# Patient Record
Sex: FEMALE | Race: WHITE | Employment: FULL TIME | URBAN - METROPOLITAN AREA
[De-identification: names, ages, dates, MRNs, and addresses within clinical notes are randomized per-mention and may not be internally consistent; named-entity substitution may affect disease eponyms.]

---

## 2017-03-21 ENCOUNTER — APPOINTMENT (OUTPATIENT)
Dept: CT IMAGING | Facility: CLINIC | Age: 37
End: 2017-03-21
Payer: COMMERCIAL

## 2017-03-21 ENCOUNTER — HOSPITAL ENCOUNTER (EMERGENCY)
Facility: CLINIC | Age: 37
Discharge: HOME | End: 2017-03-22
Attending: EMERGENCY MEDICINE
Payer: COMMERCIAL

## 2017-03-21 DIAGNOSIS — R20.0 NUMBNESS AROUND MOUTH: Primary | ICD-10-CM

## 2017-03-21 LAB
ABSOLUTE EOS #: 0.1 K/UL (ref 0–0.4)
ABSOLUTE LYMPH #: 2.8 K/UL (ref 1–4.8)
ABSOLUTE MONO #: 0.5 K/UL (ref 0.1–1.2)
ALBUMIN SERPL-MCNC: 4 G/DL (ref 3.5–5.2)
ALBUMIN/GLOBULIN RATIO: 1.3 (ref 1–2.5)
ALP BLD-CCNC: 80 U/L (ref 35–104)
ALT SERPL-CCNC: 16 U/L (ref 5–33)
ANION GAP SERPL CALCULATED.3IONS-SCNC: 14 MMOL/L (ref 9–17)
AST SERPL-CCNC: 17 U/L
BASOPHILS # BLD: 1 % (ref 0–2)
BASOPHILS ABSOLUTE: 0 K/UL (ref 0–0.2)
BILIRUB SERPL-MCNC: 0.2 MG/DL (ref 0.3–1.2)
BUN BLDV-MCNC: 17 MG/DL (ref 6–20)
BUN/CREAT BLD: ABNORMAL (ref 9–20)
CALCIUM SERPL-MCNC: 9.1 MG/DL (ref 8.6–10.4)
CHLORIDE BLD-SCNC: 103 MMOL/L (ref 98–107)
CO2: 24 MMOL/L (ref 20–31)
CREAT SERPL-MCNC: 0.8 MG/DL (ref 0.5–0.9)
DIFFERENTIAL TYPE: NORMAL
EOSINOPHILS RELATIVE PERCENT: 1 % (ref 1–4)
GFR AFRICAN AMERICAN: >60 ML/MIN
GFR NON-AFRICAN AMERICAN: >60 ML/MIN
GFR SERPL CREATININE-BSD FRML MDRD: ABNORMAL ML/MIN/{1.73_M2}
GFR SERPL CREATININE-BSD FRML MDRD: ABNORMAL ML/MIN/{1.73_M2}
GLUCOSE BLD-MCNC: 105 MG/DL (ref 70–99)
HCG(URINE) PREGNANCY TEST: NEGATIVE
HCT VFR BLD CALC: 39.7 % (ref 36–46)
HEMOGLOBIN: 13.4 G/DL (ref 12–16)
LYMPHOCYTES # BLD: 40 % (ref 24–44)
MAGNESIUM: 2.2 MG/DL (ref 1.6–2.6)
MCH RBC QN AUTO: 30.6 PG (ref 26–34)
MCHC RBC AUTO-ENTMCNC: 33.7 G/DL (ref 31–37)
MCV RBC AUTO: 90.7 FL (ref 80–100)
MONOCYTES # BLD: 7 % (ref 2–11)
PDW BLD-RTO: 13.2 % (ref 12.5–15.4)
PLATELET # BLD: 270 K/UL (ref 140–450)
PLATELET ESTIMATE: NORMAL
PMV BLD AUTO: 8 FL (ref 6–12)
POTASSIUM SERPL-SCNC: 3.7 MMOL/L (ref 3.7–5.3)
RBC # BLD: 4.37 M/UL (ref 4–5.2)
RBC # BLD: NORMAL 10*6/UL
SEG NEUTROPHILS: 51 % (ref 36–66)
SEGMENTED NEUTROPHILS ABSOLUTE COUNT: 3.6 K/UL (ref 1.8–7.7)
SODIUM BLD-SCNC: 141 MMOL/L (ref 135–144)
TOTAL PROTEIN: 7.1 G/DL (ref 6.4–8.3)
WBC # BLD: 7 K/UL (ref 3.5–11)
WBC # BLD: NORMAL 10*3/UL

## 2017-03-21 PROCEDURE — 96361 HYDRATE IV INFUSION ADD-ON: CPT

## 2017-03-21 PROCEDURE — 85025 COMPLETE CBC W/AUTO DIFF WBC: CPT

## 2017-03-21 PROCEDURE — 80053 COMPREHEN METABOLIC PANEL: CPT

## 2017-03-21 PROCEDURE — 83735 ASSAY OF MAGNESIUM: CPT

## 2017-03-21 PROCEDURE — 36415 COLL VENOUS BLD VENIPUNCTURE: CPT

## 2017-03-21 PROCEDURE — 96374 THER/PROPH/DIAG INJ IV PUSH: CPT

## 2017-03-21 PROCEDURE — 84703 CHORIONIC GONADOTROPIN ASSAY: CPT

## 2017-03-21 PROCEDURE — 6360000002 HC RX W HCPCS: Performed by: EMERGENCY MEDICINE

## 2017-03-21 PROCEDURE — 70450 CT HEAD/BRAIN W/O DYE: CPT

## 2017-03-21 PROCEDURE — 4500000002 HC ER NO CHARGE

## 2017-03-21 PROCEDURE — 2580000003 HC RX 258: Performed by: EMERGENCY MEDICINE

## 2017-03-21 RX ORDER — 0.9 % SODIUM CHLORIDE 0.9 %
1000 INTRAVENOUS SOLUTION INTRAVENOUS ONCE
Status: COMPLETED | OUTPATIENT
Start: 2017-03-21 | End: 2017-03-22

## 2017-03-21 RX ORDER — ONDANSETRON 2 MG/ML
4 INJECTION INTRAMUSCULAR; INTRAVENOUS ONCE
Status: COMPLETED | OUTPATIENT
Start: 2017-03-21 | End: 2017-03-21

## 2017-03-21 RX ADMIN — SODIUM CHLORIDE 1000 ML: 9 INJECTION, SOLUTION INTRAVENOUS at 23:15

## 2017-03-21 RX ADMIN — ONDANSETRON 4 MG: 2 INJECTION INTRAMUSCULAR; INTRAVENOUS at 23:15

## 2017-03-22 ENCOUNTER — APPOINTMENT (OUTPATIENT)
Dept: CT IMAGING | Facility: CLINIC | Age: 37
End: 2017-03-22
Payer: COMMERCIAL

## 2017-03-22 VITALS
TEMPERATURE: 97.8 F | HEIGHT: 64 IN | SYSTOLIC BLOOD PRESSURE: 128 MMHG | OXYGEN SATURATION: 99 % | RESPIRATION RATE: 14 BRPM | HEART RATE: 58 BPM | WEIGHT: 158 LBS | BODY MASS INDEX: 26.98 KG/M2 | DIASTOLIC BLOOD PRESSURE: 79 MMHG

## 2017-03-22 PROCEDURE — 6370000000 HC RX 637 (ALT 250 FOR IP): Performed by: EMERGENCY MEDICINE

## 2017-03-22 PROCEDURE — 70496 CT ANGIOGRAPHY HEAD: CPT

## 2017-03-22 PROCEDURE — 6360000004 HC RX CONTRAST MEDICATION: Performed by: EMERGENCY MEDICINE

## 2017-03-22 PROCEDURE — 2580000003 HC RX 258: Performed by: EMERGENCY MEDICINE

## 2017-03-22 RX ORDER — SODIUM CHLORIDE 0.9 % (FLUSH) 0.9 %
10 SYRINGE (ML) INJECTION PRN
Status: DISCONTINUED | OUTPATIENT
Start: 2017-03-22 | End: 2017-03-22 | Stop reason: HOSPADM

## 2017-03-22 RX ORDER — 0.9 % SODIUM CHLORIDE 0.9 %
75 INTRAVENOUS SOLUTION INTRAVENOUS ONCE
Status: COMPLETED | OUTPATIENT
Start: 2017-03-22 | End: 2017-03-22

## 2017-03-22 RX ORDER — DIPHENHYDRAMINE HCL 25 MG
25 TABLET ORAL ONCE
Status: COMPLETED | OUTPATIENT
Start: 2017-03-22 | End: 2017-03-22

## 2017-03-22 RX ADMIN — IOVERSOL 90 ML: 741 INJECTION INTRA-ARTERIAL; INTRAVENOUS at 00:45

## 2017-03-22 RX ADMIN — SODIUM CHLORIDE 1000 ML: 9 INJECTION, SOLUTION INTRAVENOUS at 00:53

## 2017-03-22 RX ADMIN — Medication 10 ML: at 00:47

## 2017-03-22 RX ADMIN — DIPHENHYDRAMINE HCL 25 MG: 25 TABLET ORAL at 02:04

## 2017-03-22 ASSESSMENT — ENCOUNTER SYMPTOMS
EYE PAIN: 0
RHINORRHEA: 0
COUGH: 0
NAUSEA: 0
SORE THROAT: 0
ABDOMINAL PAIN: 0
VOMITING: 0
SHORTNESS OF BREATH: 0
BACK PAIN: 0
DIARRHEA: 0

## 2017-03-23 ENCOUNTER — HOSPITAL ENCOUNTER (OUTPATIENT)
Age: 37
Setting detail: OBSERVATION
Discharge: HOME | End: 2017-03-25
Attending: EMERGENCY MEDICINE | Admitting: EMERGENCY MEDICINE
Payer: COMMERCIAL

## 2017-03-23 ENCOUNTER — APPOINTMENT (OUTPATIENT)
Dept: MRI IMAGING | Age: 37
End: 2017-03-23
Payer: COMMERCIAL

## 2017-03-23 DIAGNOSIS — R20.0 NUMBNESS: Primary | ICD-10-CM

## 2017-03-23 LAB
ABSOLUTE EOS #: 0 K/UL (ref 0–0.4)
ABSOLUTE LYMPH #: 1 K/UL (ref 1–4.8)
ABSOLUTE MONO #: 0.2 K/UL (ref 0.1–1.2)
ALBUMIN SERPL-MCNC: 4.3 G/DL (ref 3.5–5.2)
ALBUMIN/GLOBULIN RATIO: 1.3 (ref 1–2.5)
ALP BLD-CCNC: 82 U/L (ref 35–104)
ALT SERPL-CCNC: 16 U/L (ref 5–33)
ANION GAP SERPL CALCULATED.3IONS-SCNC: 12 MMOL/L (ref 9–17)
AST SERPL-CCNC: 16 U/L
BASOPHILS # BLD: 1 % (ref 0–2)
BASOPHILS ABSOLUTE: 0 K/UL (ref 0–0.2)
BILIRUB SERPL-MCNC: 0.52 MG/DL (ref 0.3–1.2)
BUN BLDV-MCNC: 13 MG/DL (ref 6–20)
BUN/CREAT BLD: NORMAL (ref 9–20)
CALCIUM SERPL-MCNC: 9.4 MG/DL (ref 8.6–10.4)
CHLORIDE BLD-SCNC: 105 MMOL/L (ref 98–107)
CO2: 25 MMOL/L (ref 20–31)
CREAT SERPL-MCNC: 0.77 MG/DL (ref 0.5–0.9)
DIFFERENTIAL TYPE: ABNORMAL
EOSINOPHILS RELATIVE PERCENT: 0 % (ref 1–4)
GFR AFRICAN AMERICAN: >60 ML/MIN
GFR NON-AFRICAN AMERICAN: >60 ML/MIN
GFR SERPL CREATININE-BSD FRML MDRD: NORMAL ML/MIN/{1.73_M2}
GFR SERPL CREATININE-BSD FRML MDRD: NORMAL ML/MIN/{1.73_M2}
GLUCOSE BLD-MCNC: 90 MG/DL (ref 70–99)
HCG QUALITATIVE: NEGATIVE
HCT VFR BLD CALC: 39.7 % (ref 36–46)
HEMOGLOBIN: 13.8 G/DL (ref 12–16)
LYMPHOCYTES # BLD: 18 % (ref 24–44)
MAGNESIUM: 2.5 MG/DL (ref 1.6–2.6)
MCH RBC QN AUTO: 30.8 PG (ref 26–34)
MCHC RBC AUTO-ENTMCNC: 34.8 G/DL (ref 31–37)
MCV RBC AUTO: 88.6 FL (ref 80–100)
MONOCYTES # BLD: 4 % (ref 2–11)
PDW BLD-RTO: 13.9 % (ref 12.5–15.4)
PLATELET # BLD: 287 K/UL (ref 140–450)
PLATELET ESTIMATE: ABNORMAL
PMV BLD AUTO: 8.4 FL (ref 6–12)
POTASSIUM SERPL-SCNC: 4.1 MMOL/L (ref 3.7–5.3)
RBC # BLD: 4.48 M/UL (ref 4–5.2)
RBC # BLD: ABNORMAL 10*6/UL
SEG NEUTROPHILS: 77 % (ref 36–66)
SEGMENTED NEUTROPHILS ABSOLUTE COUNT: 4.5 K/UL (ref 1.8–7.7)
SODIUM BLD-SCNC: 142 MMOL/L (ref 135–144)
TOTAL PROTEIN: 7.6 G/DL (ref 6.4–8.3)
WBC # BLD: 5.8 K/UL (ref 3.5–11)
WBC # BLD: ABNORMAL 10*3/UL

## 2017-03-23 PROCEDURE — 80053 COMPREHEN METABOLIC PANEL: CPT

## 2017-03-23 PROCEDURE — 6370000000 HC RX 637 (ALT 250 FOR IP): Performed by: EMERGENCY MEDICINE

## 2017-03-23 PROCEDURE — G0378 HOSPITAL OBSERVATION PER HR: HCPCS

## 2017-03-23 PROCEDURE — 70551 MRI BRAIN STEM W/O DYE: CPT

## 2017-03-23 PROCEDURE — 99285 EMERGENCY DEPT VISIT HI MDM: CPT

## 2017-03-23 PROCEDURE — 2580000003 HC RX 258: Performed by: EMERGENCY MEDICINE

## 2017-03-23 PROCEDURE — 6360000002 HC RX W HCPCS: Performed by: EMERGENCY MEDICINE

## 2017-03-23 PROCEDURE — 96372 THER/PROPH/DIAG INJ SC/IM: CPT

## 2017-03-23 PROCEDURE — 84703 CHORIONIC GONADOTROPIN ASSAY: CPT

## 2017-03-23 PROCEDURE — 83735 ASSAY OF MAGNESIUM: CPT

## 2017-03-23 PROCEDURE — 85025 COMPLETE CBC W/AUTO DIFF WBC: CPT

## 2017-03-23 RX ORDER — ACETAMINOPHEN 325 MG/1
650 TABLET ORAL EVERY 4 HOURS PRN
Status: DISCONTINUED | OUTPATIENT
Start: 2017-03-23 | End: 2017-03-25 | Stop reason: HOSPADM

## 2017-03-23 RX ORDER — DEXAMETHASONE 4 MG/1
4 TABLET ORAL
Status: DISCONTINUED | OUTPATIENT
Start: 2017-03-24 | End: 2017-03-24

## 2017-03-23 RX ORDER — SODIUM CHLORIDE 0.9 % (FLUSH) 0.9 %
10 SYRINGE (ML) INJECTION EVERY 12 HOURS SCHEDULED
Status: DISCONTINUED | OUTPATIENT
Start: 2017-03-23 | End: 2017-03-25 | Stop reason: HOSPADM

## 2017-03-23 RX ORDER — SODIUM CHLORIDE 0.9 % (FLUSH) 0.9 %
10 SYRINGE (ML) INJECTION PRN
Status: DISCONTINUED | OUTPATIENT
Start: 2017-03-23 | End: 2017-03-25 | Stop reason: HOSPADM

## 2017-03-23 RX ORDER — DEXAMETHASONE 4 MG/1
4 TABLET ORAL
Status: ON HOLD | COMMUNITY
End: 2017-03-25 | Stop reason: HOSPADM

## 2017-03-23 RX ADMIN — VITAMIN D, TAB 1000IU (100/BT) 1000 UNITS: 25 TAB at 17:01

## 2017-03-23 RX ADMIN — ENOXAPARIN SODIUM 40 MG: 40 INJECTION SUBCUTANEOUS at 17:01

## 2017-03-23 RX ADMIN — Medication 10 ML: at 20:00

## 2017-03-23 ASSESSMENT — ENCOUNTER SYMPTOMS
WHEEZING: 0
COUGH: 0
DIARRHEA: 0
RHINORRHEA: 0
PHOTOPHOBIA: 0
ABDOMINAL PAIN: 0
NAUSEA: 0
VOMITING: 0
SHORTNESS OF BREATH: 0
SORE THROAT: 0

## 2017-03-24 LAB
FOLATE: 16.2 NG/ML
TSH SERPL DL<=0.05 MIU/L-ACNC: 0.41 MIU/L (ref 0.3–5)
VITAMIN B-12: 337 PG/ML (ref 211–946)

## 2017-03-24 PROCEDURE — 2580000003 HC RX 258: Performed by: EMERGENCY MEDICINE

## 2017-03-24 PROCEDURE — 2580000003 HC RX 258: Performed by: PSYCHIATRY & NEUROLOGY

## 2017-03-24 PROCEDURE — 82746 ASSAY OF FOLIC ACID SERUM: CPT

## 2017-03-24 PROCEDURE — 6360000002 HC RX W HCPCS: Performed by: EMERGENCY MEDICINE

## 2017-03-24 PROCEDURE — G0378 HOSPITAL OBSERVATION PER HR: HCPCS

## 2017-03-24 PROCEDURE — 96365 THER/PROPH/DIAG IV INF INIT: CPT

## 2017-03-24 PROCEDURE — 6370000000 HC RX 637 (ALT 250 FOR IP): Performed by: PSYCHIATRY & NEUROLOGY

## 2017-03-24 PROCEDURE — 36415 COLL VENOUS BLD VENIPUNCTURE: CPT

## 2017-03-24 PROCEDURE — 84443 ASSAY THYROID STIM HORMONE: CPT

## 2017-03-24 PROCEDURE — 6360000002 HC RX W HCPCS: Performed by: PSYCHIATRY & NEUROLOGY

## 2017-03-24 PROCEDURE — 82607 VITAMIN B-12: CPT

## 2017-03-24 PROCEDURE — 95819 EEG AWAKE AND ASLEEP: CPT

## 2017-03-24 PROCEDURE — 6370000000 HC RX 637 (ALT 250 FOR IP): Performed by: EMERGENCY MEDICINE

## 2017-03-24 PROCEDURE — 96372 THER/PROPH/DIAG INJ SC/IM: CPT

## 2017-03-24 PROCEDURE — 99219 PR INITIAL OBSERVATION CARE/DAY 50 MINUTES: CPT | Performed by: PSYCHIATRY & NEUROLOGY

## 2017-03-24 RX ORDER — DEXAMETHASONE SODIUM PHOSPHATE 4 MG/ML
4 INJECTION, SOLUTION INTRA-ARTICULAR; INTRALESIONAL; INTRAMUSCULAR; INTRAVENOUS; SOFT TISSUE
Status: DISCONTINUED | OUTPATIENT
Start: 2017-03-24 | End: 2017-03-24

## 2017-03-24 RX ORDER — DEXAMETHASONE 4 MG/1
4 TABLET ORAL
Status: DISCONTINUED | OUTPATIENT
Start: 2017-03-24 | End: 2017-03-25

## 2017-03-24 RX ORDER — GABAPENTIN 100 MG/1
100 CAPSULE ORAL 3 TIMES DAILY
Status: DISCONTINUED | OUTPATIENT
Start: 2017-03-24 | End: 2017-03-25

## 2017-03-24 RX ORDER — LEVETIRACETAM 500 MG/1
500 TABLET ORAL 2 TIMES DAILY
Status: DISCONTINUED | OUTPATIENT
Start: 2017-03-24 | End: 2017-03-25

## 2017-03-24 RX ORDER — DEXAMETHASONE 4 MG/1
4 TABLET ORAL DAILY
Status: DISCONTINUED | OUTPATIENT
Start: 2017-03-24 | End: 2017-03-24

## 2017-03-24 RX ADMIN — DEXAMETHASONE 4 MG: 4 TABLET ORAL at 10:49

## 2017-03-24 RX ADMIN — Medication 10 ML: at 20:38

## 2017-03-24 RX ADMIN — VITAMIN D, TAB 1000IU (100/BT) 1000 UNITS: 25 TAB at 10:52

## 2017-03-24 RX ADMIN — LEVETIRACETAM 1000 MG: 100 INJECTION, SOLUTION INTRAVENOUS at 15:54

## 2017-03-24 RX ADMIN — Medication 10 ML: at 10:55

## 2017-03-24 RX ADMIN — LEVETIRACETAM 500 MG: 500 TABLET, FILM COATED ORAL at 20:36

## 2017-03-24 RX ADMIN — ENOXAPARIN SODIUM 40 MG: 40 INJECTION SUBCUTANEOUS at 10:52

## 2017-03-24 ASSESSMENT — PAIN DESCRIPTION - LOCATION: LOCATION: SHOULDER;LEG;FOOT

## 2017-03-24 ASSESSMENT — PAIN DESCRIPTION - DESCRIPTORS: DESCRIPTORS: NUMBNESS

## 2017-03-24 ASSESSMENT — PAIN SCALES - GENERAL: PAINLEVEL_OUTOF10: 6

## 2017-03-24 ASSESSMENT — PAIN DESCRIPTION - PAIN TYPE: TYPE: ACUTE PAIN

## 2017-03-25 VITALS
TEMPERATURE: 97.9 F | DIASTOLIC BLOOD PRESSURE: 66 MMHG | HEIGHT: 64 IN | WEIGHT: 156.8 LBS | RESPIRATION RATE: 18 BRPM | SYSTOLIC BLOOD PRESSURE: 106 MMHG | OXYGEN SATURATION: 98 % | BODY MASS INDEX: 26.77 KG/M2 | HEART RATE: 57 BPM

## 2017-03-25 PROBLEM — Z98.890 HISTORY OF SURGERY FOR CEREBRAL ANEURYSM: Status: ACTIVE | Noted: 2017-03-25

## 2017-03-25 PROBLEM — R20.2 PARESTHESIAS: Status: ACTIVE | Noted: 2017-03-25

## 2017-03-25 PROCEDURE — 97162 PT EVAL MOD COMPLEX 30 MIN: CPT

## 2017-03-25 PROCEDURE — 99225 PR SBSQ OBSERVATION CARE/DAY 25 MINUTES: CPT | Performed by: PSYCHIATRY & NEUROLOGY

## 2017-03-25 PROCEDURE — 6370000000 HC RX 637 (ALT 250 FOR IP): Performed by: EMERGENCY MEDICINE

## 2017-03-25 PROCEDURE — G8979 MOBILITY GOAL STATUS: HCPCS

## 2017-03-25 PROCEDURE — 6370000000 HC RX 637 (ALT 250 FOR IP): Performed by: PSYCHIATRY & NEUROLOGY

## 2017-03-25 PROCEDURE — G8978 MOBILITY CURRENT STATUS: HCPCS

## 2017-03-25 PROCEDURE — G8980 MOBILITY D/C STATUS: HCPCS

## 2017-03-25 PROCEDURE — 97530 THERAPEUTIC ACTIVITIES: CPT

## 2017-03-25 PROCEDURE — G0378 HOSPITAL OBSERVATION PER HR: HCPCS

## 2017-03-25 PROCEDURE — 2580000003 HC RX 258: Performed by: EMERGENCY MEDICINE

## 2017-03-25 RX ORDER — LEVETIRACETAM 500 MG/1
500 TABLET ORAL 2 TIMES DAILY
Qty: 60 TABLET | Refills: 3 | Status: SHIPPED | OUTPATIENT
Start: 2017-03-25 | End: 2017-03-25 | Stop reason: HOSPADM

## 2017-03-25 RX ADMIN — DEXAMETHASONE 4 MG: 4 TABLET ORAL at 08:26

## 2017-03-25 RX ADMIN — LEVETIRACETAM 500 MG: 500 TABLET, FILM COATED ORAL at 09:15

## 2017-03-25 RX ADMIN — Medication 10 ML: at 09:17

## 2017-03-25 RX ADMIN — VITAMIN D, TAB 1000IU (100/BT) 1000 UNITS: 25 TAB at 09:15

## 2017-10-04 ENCOUNTER — HOSPITAL ENCOUNTER (OUTPATIENT)
Age: 37
Setting detail: OBSERVATION
Discharge: HOME | End: 2017-10-06
Attending: EMERGENCY MEDICINE | Admitting: SURGERY
Payer: COMMERCIAL

## 2017-10-04 ENCOUNTER — APPOINTMENT (OUTPATIENT)
Dept: CT IMAGING | Facility: CLINIC | Age: 37
End: 2017-10-04
Payer: COMMERCIAL

## 2017-10-04 ENCOUNTER — APPOINTMENT (OUTPATIENT)
Dept: GENERAL RADIOLOGY | Facility: CLINIC | Age: 37
End: 2017-10-04
Payer: COMMERCIAL

## 2017-10-04 ENCOUNTER — HOSPITAL ENCOUNTER (EMERGENCY)
Facility: CLINIC | Age: 37
Discharge: ANOTHER HOSPITAL | End: 2017-10-04
Attending: EMERGENCY MEDICINE
Payer: COMMERCIAL

## 2017-10-04 VITALS
WEIGHT: 168 LBS | OXYGEN SATURATION: 99 % | BODY MASS INDEX: 28.68 KG/M2 | HEART RATE: 68 BPM | TEMPERATURE: 98.2 F | RESPIRATION RATE: 18 BRPM | HEIGHT: 64 IN | DIASTOLIC BLOOD PRESSURE: 73 MMHG | SYSTOLIC BLOOD PRESSURE: 108 MMHG
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
## 2017-10-04 DIAGNOSIS — S30.1XXA TRAUMATIC HEMATOMA OF FLANK, INITIAL ENCOUNTER: ICD-10-CM

## 2017-10-04 DIAGNOSIS — S42.114A CLOSED NONDISPLACED FRACTURE OF BODY OF RIGHT SCAPULA, INITIAL ENCOUNTER: Primary | ICD-10-CM

## 2017-10-04 DIAGNOSIS — S30.1XXA RIGHT FLANK HEMATOMA, INITIAL ENCOUNTER: ICD-10-CM

## 2017-10-04 LAB
-: ABNORMAL
ABSOLUTE EOS #: 0.2 K/UL (ref 0–0.4)
ABSOLUTE LYMPH #: 3.7 K/UL (ref 1–4.8)
ABSOLUTE MONO #: 0.5 K/UL (ref 0.1–1.2)
ALBUMIN SERPL-MCNC: 3.7 G/DL (ref 3.5–5.2)
ALBUMIN/GLOBULIN RATIO: 1.3 (ref 1–2.5)
ALP BLD-CCNC: 59 U/L (ref 35–104)
ALT SERPL-CCNC: 30 U/L (ref 5–33)
AMORPHOUS: ABNORMAL
ANION GAP SERPL CALCULATED.3IONS-SCNC: 11 MMOL/L (ref 9–17)
AST SERPL-CCNC: 39 U/L
BACTERIA: ABNORMAL
BASOPHILS # BLD: 0 %
BASOPHILS ABSOLUTE: 0 K/UL (ref 0–0.2)
BILIRUB SERPL-MCNC: <0.1 MG/DL (ref 0.3–1.2)
BILIRUBIN DIRECT: <0.08 MG/DL
BILIRUBIN URINE: NEGATIVE
BILIRUBIN, INDIRECT: ABNORMAL MG/DL (ref 0–1)
BUN BLDV-MCNC: 16 MG/DL (ref 6–20)
BUN/CREAT BLD: ABNORMAL (ref 9–20)
CALCIUM SERPL-MCNC: 8.7 MG/DL (ref 8.6–10.4)
CASTS UA: ABNORMAL /LPF (ref 0–2)
CHLORIDE BLD-SCNC: 103 MMOL/L (ref 98–107)
CO2: 25 MMOL/L (ref 20–31)
COLOR: YELLOW
COMMENT UA: ABNORMAL
CREAT SERPL-MCNC: 0.7 MG/DL (ref 0.5–0.9)
CRYSTALS, UA: ABNORMAL /HPF
DIFFERENTIAL TYPE: NORMAL
EOSINOPHILS RELATIVE PERCENT: 2 %
EPITHELIAL CELLS UA: ABNORMAL /HPF (ref 0–5)
GFR AFRICAN AMERICAN: >60 ML/MIN
GFR NON-AFRICAN AMERICAN: >60 ML/MIN
GFR SERPL CREATININE-BSD FRML MDRD: ABNORMAL ML/MIN/{1.73_M2}
GFR SERPL CREATININE-BSD FRML MDRD: ABNORMAL ML/MIN/{1.73_M2}
GLOBULIN: ABNORMAL G/DL (ref 1.5–3.8)
GLUCOSE BLD-MCNC: 111 MG/DL (ref 70–99)
GLUCOSE URINE: NEGATIVE
HCT VFR BLD CALC: 38.6 % (ref 36–46)
HEMOGLOBIN: 13.1 G/DL (ref 12–16)
KETONES, URINE: NEGATIVE
LEUKOCYTE ESTERASE, URINE: NEGATIVE
LIPASE: 55 U/L (ref 13–60)
LYMPHOCYTES # BLD: 45 %
MCH RBC QN AUTO: 32.1 PG (ref 26–34)
MCHC RBC AUTO-ENTMCNC: 33.9 G/DL (ref 31–37)
MCV RBC AUTO: 94.9 FL (ref 80–100)
MONOCYTES # BLD: 6 %
MUCUS: ABNORMAL
NITRITE, URINE: NEGATIVE
OTHER OBSERVATIONS UA: ABNORMAL
PDW BLD-RTO: 13.4 % (ref 12.5–15.4)
PH UA: 6.5 (ref 5–8)
PLATELET # BLD: 288 K/UL (ref 140–450)
PLATELET ESTIMATE: NORMAL
PMV BLD AUTO: 8.6 FL (ref 6–12)
POTASSIUM SERPL-SCNC: 4.8 MMOL/L (ref 3.7–5.3)
PROTEIN UA: NEGATIVE
RBC # BLD: 4.06 M/UL (ref 4–5.2)
RBC # BLD: NORMAL 10*6/UL
RBC UA: ABNORMAL /HPF (ref 0–2)
RENAL EPITHELIAL, UA: ABNORMAL /HPF
SEG NEUTROPHILS: 47 %
SEGMENTED NEUTROPHILS ABSOLUTE COUNT: 3.9 K/UL (ref 1.8–7.7)
SODIUM BLD-SCNC: 139 MMOL/L (ref 135–144)
SPECIFIC GRAVITY UA: 1.01 (ref 1–1.03)
TOTAL PROTEIN: 6.6 G/DL (ref 6.4–8.3)
TRICHOMONAS: ABNORMAL
TURBIDITY: CLEAR
URINE HGB: ABNORMAL
UROBILINOGEN, URINE: NORMAL
WBC # BLD: 8.3 K/UL (ref 3.5–11)
WBC # BLD: NORMAL 10*3/UL
WBC UA: ABNORMAL /HPF (ref 0–5)
YEAST: ABNORMAL

## 2017-10-04 PROCEDURE — 70450 CT HEAD/BRAIN W/O DYE: CPT

## 2017-10-04 PROCEDURE — 6360000002 HC RX W HCPCS: Performed by: SPECIALIST

## 2017-10-04 PROCEDURE — 6360000002 HC RX W HCPCS: Performed by: EMERGENCY MEDICINE

## 2017-10-04 PROCEDURE — 96374 THER/PROPH/DIAG INJ IV PUSH: CPT

## 2017-10-04 PROCEDURE — 2500000003 HC RX 250 WO HCPCS: Performed by: EMERGENCY MEDICINE

## 2017-10-04 PROCEDURE — 83690 ASSAY OF LIPASE: CPT

## 2017-10-04 PROCEDURE — 74177 CT ABD & PELVIS W/CONTRAST: CPT

## 2017-10-04 PROCEDURE — 36415 COLL VENOUS BLD VENIPUNCTURE: CPT

## 2017-10-04 PROCEDURE — 6360000004 HC RX CONTRAST MEDICATION: Performed by: SPECIALIST

## 2017-10-04 PROCEDURE — 2580000003 HC RX 258: Performed by: SPECIALIST

## 2017-10-04 PROCEDURE — 85025 COMPLETE CBC W/AUTO DIFF WBC: CPT

## 2017-10-04 PROCEDURE — 80076 HEPATIC FUNCTION PANEL: CPT

## 2017-10-04 PROCEDURE — 73502 X-RAY EXAM HIP UNI 2-3 VIEWS: CPT

## 2017-10-04 PROCEDURE — 81001 URINALYSIS AUTO W/SCOPE: CPT

## 2017-10-04 PROCEDURE — 2580000003 HC RX 258: Performed by: EMERGENCY MEDICINE

## 2017-10-04 PROCEDURE — 72125 CT NECK SPINE W/O DYE: CPT

## 2017-10-04 PROCEDURE — 99285 EMERGENCY DEPT VISIT HI MDM: CPT

## 2017-10-04 PROCEDURE — 6360000004 HC RX CONTRAST MEDICATION: Performed by: EMERGENCY MEDICINE

## 2017-10-04 PROCEDURE — 71260 CT THORAX DX C+: CPT

## 2017-10-04 PROCEDURE — 80048 BASIC METABOLIC PNL TOTAL CA: CPT

## 2017-10-04 PROCEDURE — 73030 X-RAY EXAM OF SHOULDER: CPT

## 2017-10-04 PROCEDURE — 96375 TX/PRO/DX INJ NEW DRUG ADDON: CPT

## 2017-10-04 RX ORDER — BACLOFEN 10 MG/1
10 TABLET ORAL 3 TIMES DAILY
COMMUNITY
End: 2017-12-19 | Stop reason: ALTCHOICE

## 2017-10-04 RX ORDER — 0.9 % SODIUM CHLORIDE 0.9 %
70 INTRAVENOUS SOLUTION INTRAVENOUS ONCE
Status: COMPLETED | OUTPATIENT
Start: 2017-10-04 | End: 2017-10-04

## 2017-10-04 RX ORDER — MORPHINE SULFATE 4 MG/ML
4 INJECTION, SOLUTION INTRAMUSCULAR; INTRAVENOUS ONCE
Status: COMPLETED | OUTPATIENT
Start: 2017-10-04 | End: 2017-10-04

## 2017-10-04 RX ORDER — 0.9 % SODIUM CHLORIDE 0.9 %
500 INTRAVENOUS SOLUTION INTRAVENOUS ONCE
Status: COMPLETED | OUTPATIENT
Start: 2017-10-04 | End: 2017-10-04

## 2017-10-04 RX ORDER — 0.9 % SODIUM CHLORIDE 0.9 %
1000 INTRAVENOUS SOLUTION INTRAVENOUS ONCE
Status: COMPLETED | OUTPATIENT
Start: 2017-10-04 | End: 2017-10-04

## 2017-10-04 RX ORDER — LIDOCAINE HYDROCHLORIDE 10 MG/ML
20 INJECTION, SOLUTION INFILTRATION; PERINEURAL ONCE
Status: COMPLETED | OUTPATIENT
Start: 2017-10-04 | End: 2017-10-04

## 2017-10-04 RX ORDER — SODIUM CHLORIDE 0.9 % (FLUSH) 0.9 %
10 SYRINGE (ML) INJECTION PRN
Status: DISCONTINUED | OUTPATIENT
Start: 2017-10-04 | End: 2017-10-04 | Stop reason: HOSPADM

## 2017-10-04 RX ORDER — FENTANYL CITRATE 50 UG/ML
50 INJECTION, SOLUTION INTRAMUSCULAR; INTRAVENOUS ONCE
Status: COMPLETED | OUTPATIENT
Start: 2017-10-04 | End: 2017-10-04

## 2017-10-04 RX ORDER — IBUPROFEN 800 MG/1
800 TABLET ORAL EVERY 8 HOURS PRN
COMMUNITY
End: 2020-03-18

## 2017-10-04 RX ORDER — SODIUM CHLORIDE 9 MG/ML
INJECTION, SOLUTION INTRAVENOUS CONTINUOUS
Status: DISCONTINUED | OUTPATIENT
Start: 2017-10-04 | End: 2017-10-04 | Stop reason: HOSPADM

## 2017-10-04 RX ADMIN — MORPHINE SULFATE 4 MG: 4 INJECTION, SOLUTION INTRAMUSCULAR; INTRAVENOUS at 17:45

## 2017-10-04 RX ADMIN — LIDOCAINE HYDROCHLORIDE 20 ML: 10 INJECTION, SOLUTION INFILTRATION; PERINEURAL at 18:50

## 2017-10-04 RX ADMIN — FENTANYL CITRATE 50 MCG: 50 INJECTION INTRAMUSCULAR; INTRAVENOUS at 20:20

## 2017-10-04 RX ADMIN — SODIUM CHLORIDE 70 ML: 9 INJECTION, SOLUTION INTRAVENOUS at 18:28

## 2017-10-04 RX ADMIN — SODIUM CHLORIDE 1000 ML: 9 INJECTION, SOLUTION INTRAVENOUS at 17:45

## 2017-10-04 RX ADMIN — HYDROMORPHONE HYDROCHLORIDE 1 MG: 1 INJECTION, SOLUTION INTRAMUSCULAR; INTRAVENOUS; SUBCUTANEOUS at 23:11

## 2017-10-04 RX ADMIN — IOVERSOL 80 ML: 741 INJECTION INTRA-ARTERIAL; INTRAVENOUS at 18:27

## 2017-10-04 RX ADMIN — SODIUM CHLORIDE 70 ML: 9 INJECTION, SOLUTION INTRAVENOUS at 20:48

## 2017-10-04 RX ADMIN — IOVERSOL 75 ML: 741 INJECTION INTRA-ARTERIAL; INTRAVENOUS at 20:47

## 2017-10-04 RX ADMIN — SODIUM CHLORIDE: 9 INJECTION, SOLUTION INTRAVENOUS at 22:14

## 2017-10-04 RX ADMIN — Medication 10 ML: at 20:48

## 2017-10-04 RX ADMIN — SODIUM CHLORIDE 500 ML: 9 INJECTION, SOLUTION INTRAVENOUS at 20:21

## 2017-10-04 RX ADMIN — Medication 10 ML: at 18:28

## 2017-10-04 ASSESSMENT — PAIN SCALES - GENERAL
PAINLEVEL_OUTOF10: 5
PAINLEVEL_OUTOF10: 9
PAINLEVEL_OUTOF10: 10
PAINLEVEL_OUTOF10: 9
PAINLEVEL_OUTOF10: 5
PAINLEVEL_OUTOF10: 9
PAINLEVEL_OUTOF10: 9

## 2017-10-04 ASSESSMENT — PAIN DESCRIPTION - PAIN TYPE
TYPE: ACUTE PAIN
TYPE: ACUTE PAIN

## 2017-10-04 ASSESSMENT — PAIN DESCRIPTION - LOCATION: LOCATION: HAND;BACK

## 2017-10-04 ASSESSMENT — PAIN DESCRIPTION - ORIENTATION: ORIENTATION: RIGHT

## 2017-10-04 ASSESSMENT — PAIN DESCRIPTION - DESCRIPTORS: DESCRIPTORS: DISCOMFORT
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
## 2017-10-04 NOTE — LETTER
[facility] Ortho/Med Surg  [address] [facility] [address]  Phone: [phone]            October 5, 2017     Patient: Asha Morataya   YOB: 1980   Date of Visit: 10/4/2017       To Whom It May Concern: It is my medical opinion that Rishabh Robb should remain out of work until cleared to return by Orthopedic Surgeon after her follow up appointment in 2 weeks. If you have any questions or concerns, please don't hesitate to call. Sincerely,        Dr.R.L. Tobar/Isaac Hernandez RN, Trauma Coordinator
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
## 2017-10-04 NOTE — IP AVS SNAPSHOT
After Visit Summary  (Discharge Instructions)    Medication List for Home    Based on the information you provided to us as well as any changes during this visit, the following is your updated medication list.  Compare this with your prescription bottles at home. If you have any questions or concerns, contact your primary care physician's office. Daily Medication List (This medication list can be shared with any healthcare provider who is helping you manage your medications)      There are NEW medications for you. START taking them after you leave the hospital        Last Dose    Next Dose Due AM NOON PM NIGHT    HYDROcodone-acetaminophen 5-325 MG per tablet   Commonly known as:  NORCO   Take 1 tablet by mouth every 4 hours as needed for Pain . 2 tablets on 10/6/2017  8:41 AM     As needed, as prescribed                           These are medications you told us you were taking at home, CONTINUE taking them after you leave the hospital        Last Dose    Next Dose Due AM NOON PM NIGHT    baclofen 10 MG tablet   Commonly known as:  LIORESAL   Take 10 mg by mouth 3 times daily                                            ibuprofen 800 MG tablet   Commonly known as:  ADVIL;MOTRIN   Take 800 mg by mouth every 6 hours as needed for Pain                                         vitamin D 1000 UNIT Tabs tablet   Commonly known as:  CHOLECALCIFEROL   Take 1,000 Units by mouth daily                                                 Where to Get Your Medications      You can get these medications from any pharmacy     Bring a paper prescription for each of these medications     HYDROcodone-acetaminophen 5-325 MG per tablet               Allergies as of 10/6/2017        Reactions    Penicillins       Immunizations as of 10/6/2017     No immunizations on file.       Last Vitals          Most Recent Value    Temp  98.4 °F (36.9 °C)    Pulse  70    Resp  18    BP  (!)  111/59 Discharge Instructions for Trauma         Continue to use your Incentive Spirometer as directed. Follow up with Orthopedics as directed. What to do after you leave the hospital:    Bathing: if you have sutures or staples in place, you may shower. No tub baths, soaking or submerging yourself in water. No hot tubs or swimming. Recommended activity: activity as tolerated and no driving for today and no driving while on analgesics    Weight Bearing Status:  NWB  right upper    Recommended diet: regular diet  Depending on your injuries, your doctor may want you to follow a specific diet. Some pain medicine can cause constipation . To avoid this problem:   · Drink plenty of fluids. · Eat foods high in fiber , such as:   ¨ Whole grain cereals and breads   ¨ Fruits and vegetables   ¨ Legumes (eg, beans, lentils)   · If you are still having problems, talk to your doctor about using laxatives or stool softeners. General questions or concerns may be called to the trauma nurse line at [phone] and please leave a message. Trauma is a life-threatening condition. Your doctor will want to closely monitor you. Be sure to go to all of your appointments. *Call your doctor if you experience any of the following symptoms: temperature greater than 99 degrees F, pain uncontrolled by pain medication, shortness of breath, nausea and vomiting, dizziness or lightheadedness, rapid, irregular heartbeat; chest pain. In case of an emergency,  CALL 911 or report to your nearest emergency department. Important information for a smoker       SMOKING: QUIT SMOKING. THIS IS THE MOST IMPORTANT ACTION YOU CAN TAKE TO IMPROVE YOUR CURRENT AND FUTURE HEALTH. Call the [address] [facility] NOW ([phone])    Smoking harms nonsmokers.  When nonsmokers are around people who smoke, they absorb nicotine, carbon monoxide, and other ingredients of tobacco smoke. DO NOT SMOKE AROUND CHILDREN     Children exposed to secondhand smoke are at an increased risk of:  Sudden Infant Death Syndrome (SIDS), acute respiratory infections, inflammation of the middle ear, and severe asthma. Over a longer time, it causes heart disease and lung cancer. There is no safe level of exposure to secondhand smoke. MyChart Signup     Our records indicate that you have an active [employer] account. You can view your After Visit Summary by going to [email or web address] and logging in with your [employer] username and password. If you don't have a [employer] username and password but a parent or guardian has access to your record, the parent or guardian should login with their own [employer] username and password and access your record to view the After Visit Summary. Additional Information  If you have questions, please contact the physician practice where you receive care. Remember, [employer] is NOT to be used for urgent needs. For medical emergencies, dial 911. For questions regarding your [employer] account call [phone]. If you have a clinical question, please call your doctor's office. View your information online  ? Review your current list of  medications, immunization, and allergies. ? Review your future test results online . ? Review your discharge instructions provided by your caregivers at discharge    Certain functionality such as prescription refills, scheduling appointments or sending messages to your provider are not activated if your provider does not use [email or web address] in his/her office    For questions regarding your [employer] account call [phone]. If you have a clinical question, please call your doctor's office.          The information on all pages of the After Visit Summary has been reviewed with me, the patient and/or responsible adult, by my health care provider(s). I had the opportunity to ask questions regarding this information. I understand I should dispose of my armband safely at home to protect my health information. A complete copy of the After Visit Summary has been given to me, the patient and/or responsible adult.            Patient Signature/Responsible Adult:____________________    Clinician Signature:_____________________    Date:_____________________    Time:_____________________

## 2017-10-04 NOTE — IP AVS SNAPSHOT
Patient Information     Patient Name АННА [place] 1980         This is your updated medication list to keep with you all times      TAKE these medications     baclofen 10 MG tablet   Commonly known as:  LIORESAL       HYDROcodone-acetaminophen 5-325 MG per tablet   Commonly known as:  NORCO   Take 1 tablet by mouth every 4 hours as needed for Pain .        ibuprofen 800 MG tablet   Commonly known as:  ADVIL;MOTRIN       vitamin D 1000 UNIT Tabs tablet   Commonly known as:  CHOLECALCIFEROL

## 2017-10-05 PROBLEM — S42.116A: Status: ACTIVE | Noted: 2017-10-05

## 2017-10-05 PROBLEM — S30.1XXA RIGHT FLANK HEMATOMA: Status: ACTIVE | Noted: 2017-10-05
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
## 2017-10-05 LAB
HCT VFR BLD CALC: 33.3 % (ref 36–46)
HEMOGLOBIN: 11.1 G/DL (ref 12–16)

## 2017-10-05 PROCEDURE — G0378 HOSPITAL OBSERVATION PER HR: HCPCS

## 2017-10-05 PROCEDURE — 97165 OT EVAL LOW COMPLEX 30 MIN: CPT

## 2017-10-05 PROCEDURE — G8978 MOBILITY CURRENT STATUS: HCPCS

## 2017-10-05 PROCEDURE — G8987 SELF CARE CURRENT STATUS: HCPCS

## 2017-10-05 PROCEDURE — G8980 MOBILITY D/C STATUS: HCPCS

## 2017-10-05 PROCEDURE — 6370000000 HC RX 637 (ALT 250 FOR IP): Performed by: SURGERY

## 2017-10-05 PROCEDURE — 36415 COLL VENOUS BLD VENIPUNCTURE: CPT

## 2017-10-05 PROCEDURE — 96374 THER/PROPH/DIAG INJ IV PUSH: CPT

## 2017-10-05 PROCEDURE — G8979 MOBILITY GOAL STATUS: HCPCS

## 2017-10-05 PROCEDURE — 97162 PT EVAL MOD COMPLEX 30 MIN: CPT

## 2017-10-05 PROCEDURE — 6360000002 HC RX W HCPCS: Performed by: STUDENT IN AN ORGANIZED HEALTH CARE EDUCATION/TRAINING PROGRAM

## 2017-10-05 PROCEDURE — G8988 SELF CARE GOAL STATUS: HCPCS

## 2017-10-05 PROCEDURE — 97535 SELF CARE MNGMENT TRAINING: CPT

## 2017-10-05 PROCEDURE — 6370000000 HC RX 637 (ALT 250 FOR IP): Performed by: STUDENT IN AN ORGANIZED HEALTH CARE EDUCATION/TRAINING PROGRAM

## 2017-10-05 PROCEDURE — 85014 HEMATOCRIT: CPT

## 2017-10-05 PROCEDURE — G8989 SELF CARE D/C STATUS: HCPCS

## 2017-10-05 PROCEDURE — 2580000003 HC RX 258: Performed by: SURGERY

## 2017-10-05 PROCEDURE — 85018 HEMOGLOBIN: CPT

## 2017-10-05 PROCEDURE — 97530 THERAPEUTIC ACTIVITIES: CPT

## 2017-10-05 RX ORDER — FENTANYL CITRATE 50 UG/ML
50 INJECTION, SOLUTION INTRAMUSCULAR; INTRAVENOUS ONCE
Status: COMPLETED | OUTPATIENT
Start: 2017-10-05 | End: 2017-10-05

## 2017-10-05 RX ORDER — ACETAMINOPHEN 325 MG/1
650 TABLET ORAL EVERY 4 HOURS PRN
Status: DISCONTINUED | OUTPATIENT
Start: 2017-10-05 | End: 2017-10-06 | Stop reason: HOSPADM

## 2017-10-05 RX ORDER — HYDROCODONE BITARTRATE AND ACETAMINOPHEN 5; 325 MG/1; MG/1
1 TABLET ORAL EVERY 4 HOURS PRN
Status: DISCONTINUED | OUTPATIENT
Start: 2017-10-05 | End: 2017-10-05

## 2017-10-05 RX ORDER — HYDROCODONE BITARTRATE AND ACETAMINOPHEN 5; 325 MG/1; MG/1
2 TABLET ORAL EVERY 4 HOURS PRN
Status: DISCONTINUED | OUTPATIENT
Start: 2017-10-05 | End: 2017-10-06 | Stop reason: HOSPADM

## 2017-10-05 RX ORDER — SODIUM CHLORIDE 0.9 % (FLUSH) 0.9 %
10 SYRINGE (ML) INJECTION PRN
Status: DISCONTINUED | OUTPATIENT
Start: 2017-10-05 | End: 2017-10-06 | Stop reason: HOSPADM

## 2017-10-05 RX ORDER — SODIUM CHLORIDE 0.9 % (FLUSH) 0.9 %
10 SYRINGE (ML) INJECTION EVERY 12 HOURS SCHEDULED
Status: DISCONTINUED | OUTPATIENT
Start: 2017-10-05 | End: 2017-10-06 | Stop reason: HOSPADM

## 2017-10-05 RX ORDER — HYDROCODONE BITARTRATE AND ACETAMINOPHEN 5; 325 MG/1; MG/1
1 TABLET ORAL EVERY 4 HOURS PRN
Status: DISCONTINUED | OUTPATIENT
Start: 2017-10-05 | End: 2017-10-06 | Stop reason: HOSPADM

## 2017-10-05 RX ORDER — ONDANSETRON 2 MG/ML
4 INJECTION INTRAMUSCULAR; INTRAVENOUS EVERY 6 HOURS PRN
Status: DISCONTINUED | OUTPATIENT
Start: 2017-10-05 | End: 2017-10-06 | Stop reason: HOSPADM

## 2017-10-05 RX ADMIN — HYDROCODONE BITARTRATE AND ACETAMINOPHEN 1 TABLET: 5; 325 TABLET ORAL at 21:31

## 2017-10-05 RX ADMIN — HYDROCODONE BITARTRATE AND ACETAMINOPHEN 1 TABLET: 5; 325 TABLET ORAL at 06:42

## 2017-10-05 RX ADMIN — HYDROCODONE BITARTRATE AND ACETAMINOPHEN 1 TABLET: 5; 325 TABLET ORAL at 11:34

## 2017-10-05 RX ADMIN — FENTANYL CITRATE 50 MCG: 50 INJECTION INTRAMUSCULAR; INTRAVENOUS at 01:41

## 2017-10-05 RX ADMIN — HYDROCODONE BITARTRATE AND ACETAMINOPHEN 1 TABLET: 5; 325 TABLET ORAL at 15:31

## 2017-10-05 RX ADMIN — HYDROCODONE BITARTRATE AND ACETAMINOPHEN 1 TABLET: 5; 325 TABLET ORAL at 02:55

## 2017-10-05 RX ADMIN — SODIUM CHLORIDE, PRESERVATIVE FREE 10 ML: 5 INJECTION INTRAVENOUS at 09:00

## 2017-10-05 RX ADMIN — HYDROCODONE BITARTRATE AND ACETAMINOPHEN 1 TABLET: 5; 325 TABLET ORAL at 19:23

## 2017-10-05 ASSESSMENT — PAIN DESCRIPTION - ONSET
ONSET: ON-GOING
ONSET: ON-GOING

## 2017-10-05 ASSESSMENT — PAIN SCALES - GENERAL
PAINLEVEL_OUTOF10: 5
PAINLEVEL_OUTOF10: 9
PAINLEVEL_OUTOF10: 6
PAINLEVEL_OUTOF10: 9
PAINLEVEL_OUTOF10: 6
PAINLEVEL_OUTOF10: 7
PAINLEVEL_OUTOF10: 9
PAINLEVEL_OUTOF10: 8
PAINLEVEL_OUTOF10: 5
PAINLEVEL_OUTOF10: 8
PAINLEVEL_OUTOF10: 8
PAINLEVEL_OUTOF10: 6
PAINLEVEL_OUTOF10: 9

## 2017-10-05 ASSESSMENT — ENCOUNTER SYMPTOMS
RHINORRHEA: 0
ABDOMINAL DISTENTION: 0
BLOOD IN STOOL: 0
SORE THROAT: 0
WHEEZING: 0
ABDOMINAL PAIN: 0
VOMITING: 0
COUGH: 0
PHOTOPHOBIA: 0
SHORTNESS OF BREATH: 0
BACK PAIN: 0
NAUSEA: 0

## 2017-10-05 ASSESSMENT — PAIN DESCRIPTION - LOCATION
LOCATION: NECK
LOCATION: SHOULDER;NECK
LOCATION: SHOULDER
LOCATION: HIP;SHOULDER;NECK

## 2017-10-05 ASSESSMENT — PAIN DESCRIPTION - ORIENTATION
ORIENTATION: RIGHT
ORIENTATION: ANTERIOR

## 2017-10-05 ASSESSMENT — PAIN DESCRIPTION - DESCRIPTORS
DESCRIPTORS: ACHING;DISCOMFORT
DESCRIPTORS: ACHING;DISCOMFORT;SORE
DESCRIPTORS: ACHING;DISCOMFORT

## 2017-10-05 ASSESSMENT — PAIN DESCRIPTION - PROGRESSION
CLINICAL_PROGRESSION: NOT CHANGED
CLINICAL_PROGRESSION: NOT CHANGED

## 2017-10-05 ASSESSMENT — PAIN DESCRIPTION - PAIN TYPE
TYPE: ACUTE PAIN

## 2017-10-05 ASSESSMENT — PAIN DESCRIPTION - FREQUENCY
FREQUENCY: CONTINUOUS
FREQUENCY: CONTINUOUS
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
## 2017-10-05 NOTE — ED PROVIDER NOTES
DEFICITS. LUNGS CLEAR TISHA. CARDIAC-S1S2, RRR, NO MRG. ABD SOFT, NONDISTENDED, NONTENDER. ECCHYMOSIS RIGHT LOW LAT FLANK, NEAR HIP. RADIOGRAPHS/CT INTERPS REVIEWED. IMP-NONDISPLACED FX RIGHT SCAPULA, CONTUSIONS. CHI. PLAN-TRAUMA CONSULT.             Sujatha Sanz MD  10/05/17 [id number]
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
## 2017-10-05 NOTE — PROGRESS NOTES
Speech Language Pathology  [facility]  Speech Language Pathology    COGNITIVE ASSESSMENT    NO LOC or CHI- ST TO DEFER AT THIS TIME      Date: 10/5/2017  Patient Name: Romeo Morocho  YOB: 1980   AGE: 40 y.o. MRN: [id number]        PT NOT SEEN FOR COGNITIVE ASSESSMENT AS NO LOC OR [facility]I IS DOCUMENTED. ST TO DEFER AT THIS TIME.        Sweta Beyer  10/5/2017  7:17 AM
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
## 2017-10-05 NOTE — PLAN OF CARE
Problem: Pain:  Goal: Pain level will decrease  Pain level will decrease   Outcome: Ongoing  Goal: Control of acute pain  Control of acute pain   Outcome: Ongoing  Goal: Control of chronic pain  Control of chronic pain   Outcome: Ongoing    Problem: Falls - Risk of  Goal: Absence of falls  Outcome: Met This Shift

## 2017-10-05 NOTE — CONSULTS
Spouse name: N/A    Number of children: N/A    Years of education: N/A     Social History Main Topics    Smoking status: Former Smoker     Types: Cigarettes     Quit date: 5/18/2015    Smokeless tobacco: None    Alcohol use No    Drug use: No    Sexual activity: Not Asked     Other Topics Concern    None     Social History Narrative     Family History:  History reviewed. No pertinent family history. REVIEW OF SYSTEMS:    Constitutional: Negative for fever and chills. HENT: Negative for congestion. Eyes: Negative for blurred vision and double vision. Respiratory: Negative for cough, shortness of breath and wheezing. Cardiovascular: Negative for chest pain and palpitations. Gastrointestinal: Negative for nausea. Negative for vomiting. Musculoskeletal: Positive for diffuse myalgias and right shoulder pain. Skin: Negative for itching and rash. Neurological: Negative for dizziness, sensory change and headaches. Psychiatric/Behavioral: Negative for depression and suicidal ideas. PHYSICAL EXAM:  /68  Pulse 95  Temp 99.9 °F (37.7 °C) (Oral)   Resp 16  Wt 168 lb (76.2 kg)  LMP 09/27/2017  SpO2 97%  BMI 28.84 kg/m2  Gen: AAOx3, NAD, cooperative  Head: normocephalic, approximately 1 cm laceration to right scalp approximated with retained staples  Neck: supple, trachea midline, no JVD  Chest: non labored breathing, b/l clavicles without TTP, crepitus, step off, or deformity  Heart: regular rate, no dependent edema, distal pulses 2+  Abdomen: soft, ND, diffuse pain noted to right flank  Pelvis: stable to anterior and lateral compression without pain or crepitus  RUE: No ecchymoses, abrasion, deformity, or lacerations. Skin intact. TTP noted about the periscapular region posteriorly. Full AROM without pain elbow/wrist/fingers; near full AROM right shoulder with mild pain. Compartments soft and compressible. Ulnar/Median/AIN/PIN motor intact, 5/5  strength.  Median/Radial/Ulnar Dr. Kamla Akbar in 2 weeks  -Please page Ortho with any questions or concerns    Lewis Pineda DO  PGY-2 Orthopedic Surgery  1:20 AM 10/5/2017

## 2017-10-06 VITALS
BODY MASS INDEX: 30.73 KG/M2 | TEMPERATURE: 98.4 F | HEART RATE: 70 BPM | OXYGEN SATURATION: 98 % | DIASTOLIC BLOOD PRESSURE: 59 MMHG | WEIGHT: 180 LBS | HEIGHT: 64 IN | SYSTOLIC BLOOD PRESSURE: 111 MMHG | RESPIRATION RATE: 18 BRPM

## 2017-10-06 LAB
HCT VFR BLD CALC: 34.7 % (ref 36–46)
HEMOGLOBIN: 11.7 G/DL (ref 12–16)

## 2017-10-06 PROCEDURE — 85014 HEMATOCRIT: CPT

## 2017-10-06 PROCEDURE — G0378 HOSPITAL OBSERVATION PER HR: HCPCS

## 2017-10-06 PROCEDURE — 6370000000 HC RX 637 (ALT 250 FOR IP): Performed by: STUDENT IN AN ORGANIZED HEALTH CARE EDUCATION/TRAINING PROGRAM

## 2017-10-06 PROCEDURE — 85018 HEMOGLOBIN: CPT

## 2017-10-06 PROCEDURE — 36415 COLL VENOUS BLD VENIPUNCTURE: CPT

## 2017-10-06 RX ORDER — HYDROCODONE BITARTRATE AND ACETAMINOPHEN 5; 325 MG/1; MG/1
1 TABLET ORAL EVERY 4 HOURS PRN
Qty: 18 TABLET | Refills: 0 | Status: SHIPPED | OUTPATIENT
Start: 2017-10-06 | End: 2017-10-09

## 2017-10-06 RX ADMIN — HYDROCODONE BITARTRATE AND ACETAMINOPHEN 2 TABLET: 5; 325 TABLET ORAL at 12:57

## 2017-10-06 RX ADMIN — HYDROCODONE BITARTRATE AND ACETAMINOPHEN 2 TABLET: 5; 325 TABLET ORAL at 01:10

## 2017-10-06 RX ADMIN — HYDROCODONE BITARTRATE AND ACETAMINOPHEN 2 TABLET: 5; 325 TABLET ORAL at 08:41

## 2017-10-06 RX ADMIN — HYDROCODONE BITARTRATE AND ACETAMINOPHEN 2 TABLET: 5; 325 TABLET ORAL at 04:52

## 2017-10-06 ASSESSMENT — PAIN SCALES - GENERAL
PAINLEVEL_OUTOF10: 6
PAINLEVEL_OUTOF10: 4
PAINLEVEL_OUTOF10: 7
PAINLEVEL_OUTOF10: 4
PAINLEVEL_OUTOF10: 8

## 2017-10-06 ASSESSMENT — PAIN DESCRIPTION - ORIENTATION: ORIENTATION: RIGHT

## 2017-10-06 ASSESSMENT — PAIN DESCRIPTION - PROGRESSION
CLINICAL_PROGRESSION: NOT CHANGED

## 2017-10-06 ASSESSMENT — PAIN DESCRIPTION - DESCRIPTORS: DESCRIPTORS: CONSTANT;ACHING

## 2017-10-06 ASSESSMENT — PAIN DESCRIPTION - ONSET: ONSET: ON-GOING

## 2017-10-06 ASSESSMENT — PAIN DESCRIPTION - FREQUENCY: FREQUENCY: CONTINUOUS

## 2017-10-06 ASSESSMENT — PAIN DESCRIPTION - PAIN TYPE: TYPE: ACUTE PAIN;SURGICAL PAIN

## 2017-10-06 ASSESSMENT — PAIN DESCRIPTION - LOCATION: LOCATION: NECK;SHOULDER
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
## 2017-10-06 NOTE — CARE COORDINATION
Discharge [facility] Case Management Department  Written by: Cecilia Watson RN    Patient Name: Alex Rm  Attending Provider: No att. providers found  Admit Date: 10/4/2017 11:44 PM  MRN: [id number]  Account: [de-identified]                     : 1980  Discharge Date: 10/6/2017      Disposition: home    Cecilia Watson RN

## 2017-10-13 NOTE — DISCHARGE SUMMARY
distended, bowel sounds present  Neurological - motor and sensory grossly normal bilaterally  Musculoskeletal - full range of motion without pain  Extremities - peripheral pulses normal, no pedal edema, no clubbing or cyanosis      LABS     No results for input(s): WBC, HGB, HCT, PLT, NA, K, CL, CO2, BUN, CREATININE in the last 72 hours. DISCHARGE INSTRUCTIONS     Discharge Medications:        Medication List      CONTINUE taking these medications    baclofen 10 MG tablet  Commonly known as:  LIORESAL     ibuprofen 800 MG tablet  Commonly known as:  ADVIL;MOTRIN     vitamin D 1000 UNIT Tabs tablet  Commonly known as:  CHOLECALCIFEROL        ASK your doctor about these medications    HYDROcodone-acetaminophen 5-325 MG per tablet  Commonly known as:  NORCO  Take 1 tablet by mouth every 4 hours as needed for Pain . Ask about: Should I take this medication? Where to Get Your Medications      You can get these medications from any pharmacy    Bring a paper prescription for each of these medications  · HYDROcodone-acetaminophen 5-325 MG per tablet       Diet:   diet as tolerated  Activity: activity as tolerated, no driving while on analgesics and no lifting more than 10-20 lbs for next two weeks  Wound Care: Daily and as needed  Follow-up:  in the next few weeks with Raysa Enamorado CNP,  Follow up in [address] [place] [address] in 1-2 weeks. Time Spent for discharge: 30 minutes    Jerry Worthington  10/12/2017, 9:08 PM     Trauma, Emergency and Critical Surgical Services  Attending Note      I have reviewed the above MARY note(s) and I either performed the key elements of the medical history and physical exam or was present with the resident when the key elements of the medical history and physical exam were performed. I have discussed the findings, established the care plan and recommendations with Resident, MARY RN, bedside nurse.     Paty Vidal MD  10/13/2017  7:58 AM

## 2017-10-17 ENCOUNTER — OFFICE VISIT (OUTPATIENT)
Dept: ORTHOPEDIC SURGERY | Age: 37
End: 2017-10-17
Payer: COMMERCIAL

## 2017-10-17 VITALS — WEIGHT: 168 LBS | HEIGHT: 64 IN | BODY MASS INDEX: 28.68 KG/M2

## 2017-10-17 DIAGNOSIS — M75.101 ROTATOR CUFF SYNDROME OF RIGHT SHOULDER: ICD-10-CM

## 2017-10-17 DIAGNOSIS — M75.21 BICEPS TENDINITIS OF RIGHT UPPER EXTREMITY: ICD-10-CM

## 2017-10-17 DIAGNOSIS — S42.114A CLOSED NONDISPLACED FRACTURE OF BODY OF RIGHT SCAPULA, INITIAL ENCOUNTER: Primary | ICD-10-CM

## 2017-10-17 PROCEDURE — 99024 POSTOP FOLLOW-UP VISIT: CPT | Performed by: ORTHOPAEDIC SURGERY

## 2017-10-17 PROCEDURE — 23570 CLTX SCAPULAR FX W/O MNPJ: CPT | Performed by: ORTHOPAEDIC SURGERY

## 2017-10-17 RX ORDER — SULFAMETHOXAZOLE AND TRIMETHOPRIM 400; 80 MG/1; MG/1
1 TABLET ORAL 2 TIMES DAILY
COMMUNITY
End: 2017-11-16

## 2017-10-17 ASSESSMENT — ENCOUNTER SYMPTOMS
COLOR CHANGE: 0
ABDOMINAL PAIN: 0
WHEEZING: 0
SHORTNESS OF BREATH: 0
ABDOMINAL DISTENTION: 0
COUGH: 0
EYE PAIN: 0
VOMITING: 0
NAUSEA: 0

## 2017-10-17 NOTE — PROGRESS NOTES
Physical Exam  Gen: alert and oriented  Psych:  Appropriate affect; Appropriate knowledge base; Appropriate mood; No hallucinations; Head: normocephalic atraumatic   Chest: symmetric chest excursion  Pelvis: stable  Ortho Exam  RUE: TTP about anterior shoulder near biceps course. TTP in anterior musculature superior to clavicle.  flexion, 150 abduction, symmetric levels of internal rotation along spine up to T10-12. RC strength 4/5 throughout but comparable to contralateral side. Negative hawkin's, neer's, o'nhi's, spurling's, speed's, hornblower's. Positive yergason's. No muscular atrophy appreciated. Median/ulnar/radial/PIN/AIN motor intact. C4-T1 SILT. Compartments soft and compressible. Hand is warm and perfused. Radial pulse 2+ w/ BCR. ASSESSMENT:     1. Closed nondisplaced fracture of body of right scapula, initial encounter    2. Biceps tendinitis of right upper extremity    3. Rotator cuff syndrome of right shoulder         PLAN:     1. Rx for PT for ROM and strengthening of right shoulder  2. F/u in 4 weeks  3. If no improvement in symptoms at that time we will consider an MRI vs possible steroid injection   4. Motrin/tylenol prn pain at this time    Return in about 4 weeks (around 11/14/2017). No orders of the defined types were placed in this encounter. Orders Placed This Encounter   Procedures   [facility]     Referral Priority:   Routine     Referral Type:   Eval and Treat     Referral Reason:   Specialty Services Required     Requested Specialty:   Physical Therapy     Number of Visits Requested:   1        Sean Astudillo DO  PGY-2, Department of [facility] [address], [place], [place]  2:48 PM 10/17/2017      Attending:    Jorge Alberto Perdomo DO, reviewed the information and imaging if available. The case was discussed with the resident and plan reviewed.

## 2017-10-23 ENCOUNTER — HOSPITAL ENCOUNTER (OUTPATIENT)
Dept: PHYSICAL THERAPY | Facility: CLINIC | Age: 37
Setting detail: THERAPIES SERIES
Discharge: HOME | End: 2017-10-23
Payer: COMMERCIAL

## 2017-11-16 ENCOUNTER — OFFICE VISIT (OUTPATIENT)
Dept: ORTHOPEDIC SURGERY | Age: 37
End: 2017-11-16
Payer: COMMERCIAL

## 2017-11-16 VITALS — HEIGHT: 64 IN | WEIGHT: 167.99 LBS | BODY MASS INDEX: 28.68 KG/M2

## 2017-11-16 DIAGNOSIS — T14.8XXA MUSCLE STRAIN: ICD-10-CM

## 2017-11-16 DIAGNOSIS — S42.114A CLOSED NONDISPLACED FRACTURE OF BODY OF RIGHT SCAPULA, INITIAL ENCOUNTER: Primary | ICD-10-CM

## 2017-11-16 PROCEDURE — 99024 POSTOP FOLLOW-UP VISIT: CPT | Performed by: ORTHOPAEDIC SURGERY

## 2017-11-16 PROCEDURE — 23570 CLTX SCAPULAR FX W/O MNPJ: CPT | Performed by: ORTHOPAEDIC SURGERY
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
## 2017-12-19 ENCOUNTER — OFFICE VISIT (OUTPATIENT)
Dept: ORTHOPEDIC SURGERY | Age: 37
End: 2017-12-19

## 2017-12-19 VITALS
HEIGHT: 64 IN | BODY MASS INDEX: 30.22 KG/M2 | SYSTOLIC BLOOD PRESSURE: 117 MMHG | DIASTOLIC BLOOD PRESSURE: 72 MMHG | WEIGHT: 177 LBS

## 2017-12-19 DIAGNOSIS — S42.114D CLOSED NONDISPLACED FRACTURE OF BODY OF RIGHT SCAPULA WITH ROUTINE HEALING, SUBSEQUENT ENCOUNTER: Primary | ICD-10-CM

## 2017-12-19 DIAGNOSIS — T14.8XXA MUSCLE STRAIN: ICD-10-CM

## 2017-12-19 PROCEDURE — 99024 POSTOP FOLLOW-UP VISIT: CPT | Performed by: ORTHOPAEDIC SURGERY

## 2017-12-19 NOTE — LETTER
[address]  Phone: [phone]  Fax: [phone]    China Diego DO        December 19, 2017     Patient: Asha Morataya   YOB: 1980   Date of Visit: 12/19/2017       To Whom it May Concern:    Rishabh Robb was seen in my clinic on 12/19/2017. Marck Iraheta was seen in regards to her Closed nondisplaced fracture of body of right scapula. Based on my exam and clinical findings of the right scapula, she is released back to work on 12/20/17. If you have any questions or concerns, please don't hesitate to call.     Sincerely,           China Diego DO

## 2017-12-20 NOTE — PROGRESS NOTES
alert and oriented and sitting comfortably in our office. Right Shoulder Exam     Tenderness   The patient is experiencing tenderness in the clavicle (proximal-middle third region of SCM insertion). Range of Motion   Active Abduction: 150   Passive Abduction: 170   Extension: 40   Forward Flexion: 160   External Rotation: 80   Internal Rotation 0 degrees: T7   Internal Rotation 90 degrees: 80     Muscle Strength   Abduction: 4/5   Internal Rotation: 5/5   External Rotation: 5/5   Biceps: 5/5     Tests   Apprehension: negative  Drop Arm: negative    Other   Erythema: absent  Scars: absent  Sensation: normal (C4-T1 sensation grossly intact)  Pulse: present (2+ radial and ulnar)    Comments:  Median/Radial/Ulnar/AIN/PIN gross motor intact, 5/5  strength. Skin intact. No atrophy of the supraspinous or infraspinous fossae. No significant point TTP or crepitus scapula. Neuro: AAOx3, EOMI  Pulm: respirations unlabored and regular. Skin: warm, well perfused without rashes or lesions  Psych: patient has good fund of knowledge and displays understanding of exam, diagnosis, and plan. Radiology:   None obtained today in clinic. Assessment:      1. Closed nondisplaced fracture of body of right scapula with routine healing, subsequent encounter    2. Muscle strain       Plan:      -Well-healed right scapular body fracture and right periscapular and clavicular muscle strains well-improved with NSAIDs and physical therapy.  -Tylenol and/or Motrin as needed for any pain  -Ice for any pain and swelling  -Patient has new complaints of left flank/back pain today in clinic with radiation to the abdominal region and occasionally legs described as fullness/pulling, recommend referral by PCP to spine specialist for further evaluation as could represent radicular pain and some mild neurogenic claudication.  -No further ortho intervention regarding osseous injuries.     Follow up:Return if symptoms worsen or fail to improve. No orders of the defined types were placed in this encounter. No orders of the defined types were placed in this encounter. Electronically signed by Amita Henderson DO on 12/20/2017 at 9:09 AM    (Please note that portions of this note were completed with a voice recognition program. Efforts were made to edit the dictations but occasionally words are mis-transcribed.)    Attending:    Agree with above  F/U Collins FORD DO, reviewed the information and imaging if available. The case was discussed with the resident and plan reviewed.

## 2018-04-10 ENCOUNTER — APPOINTMENT (OUTPATIENT)
Dept: GENERAL RADIOLOGY | Facility: CLINIC | Age: 38
End: 2018-04-10
Payer: COMMERCIAL

## 2018-04-10 ENCOUNTER — HOSPITAL ENCOUNTER (EMERGENCY)
Facility: CLINIC | Age: 38
Discharge: HOME | End: 2018-04-10
Attending: EMERGENCY MEDICINE
Payer: COMMERCIAL

## 2018-04-10 VITALS
BODY MASS INDEX: 28.68 KG/M2 | OXYGEN SATURATION: 97 % | DIASTOLIC BLOOD PRESSURE: 64 MMHG | RESPIRATION RATE: 15 BRPM | HEART RATE: 67 BPM | WEIGHT: 168 LBS | HEIGHT: 64 IN | SYSTOLIC BLOOD PRESSURE: 93 MMHG | TEMPERATURE: 97.7 F

## 2018-04-10 DIAGNOSIS — R07.89 ATYPICAL CHEST PAIN: Primary | ICD-10-CM

## 2018-04-10 DIAGNOSIS — R05.9 COUGH: ICD-10-CM

## 2018-04-10 PROBLEM — F32.A DEPRESSION: Status: ACTIVE | Noted: 2017-01-20

## 2018-04-10 PROBLEM — G89.29 CHRONIC NECK PAIN: Status: ACTIVE | Noted: 2018-01-26

## 2018-04-10 PROBLEM — G89.29 CHRONIC LEFT-SIDED LOW BACK PAIN WITH BILATERAL SCIATICA: Status: ACTIVE | Noted: 2017-12-22

## 2018-04-10 PROBLEM — K80.20 GALLSTONE: Status: ACTIVE | Noted: 2017-12-22

## 2018-04-10 PROBLEM — L70.9 ACNE: Status: ACTIVE | Noted: 2017-09-25

## 2018-04-10 PROBLEM — M54.2 CHRONIC NECK PAIN: Status: ACTIVE | Noted: 2018-01-26

## 2018-04-10 PROBLEM — M54.41 CHRONIC LEFT-SIDED LOW BACK PAIN WITH BILATERAL SCIATICA: Status: ACTIVE | Noted: 2017-12-22

## 2018-04-10 PROBLEM — M54.42 CHRONIC LEFT-SIDED LOW BACK PAIN WITH BILATERAL SCIATICA: Status: ACTIVE | Noted: 2017-12-22

## 2018-04-10 LAB
ABSOLUTE EOS #: 0.18 K/UL (ref 0–0.4)
ABSOLUTE IMMATURE GRANULOCYTE: ABNORMAL K/UL (ref 0–0.3)
ABSOLUTE LYMPH #: 4.77 K/UL (ref 1–4.8)
ABSOLUTE MONO #: 0.18 K/UL (ref 0.1–0.8)
ANION GAP SERPL CALCULATED.3IONS-SCNC: 13 MMOL/L (ref 9–17)
BASOPHILS # BLD: 0 % (ref 0–2)
BASOPHILS ABSOLUTE: 0 K/UL (ref 0–0.2)
BUN BLDV-MCNC: 15 MG/DL (ref 6–20)
BUN/CREAT BLD: ABNORMAL (ref 9–20)
CALCIUM SERPL-MCNC: 9.4 MG/DL (ref 8.6–10.4)
CHLORIDE BLD-SCNC: 100 MMOL/L (ref 98–107)
CO2: 27 MMOL/L (ref 20–31)
CREAT SERPL-MCNC: 0.8 MG/DL (ref 0.5–0.9)
D-DIMER QUANTITATIVE: 0.33 MG/L FEU
DIFFERENTIAL TYPE: ABNORMAL
DIRECT EXAM: NORMAL
EOSINOPHILS RELATIVE PERCENT: 2 % (ref 1–4)
GFR AFRICAN AMERICAN: >60 ML/MIN
GFR NON-AFRICAN AMERICAN: >60 ML/MIN
GFR SERPL CREATININE-BSD FRML MDRD: ABNORMAL ML/MIN/{1.73_M2}
GFR SERPL CREATININE-BSD FRML MDRD: ABNORMAL ML/MIN/{1.73_M2}
GLUCOSE BLD-MCNC: 106 MG/DL (ref 70–99)
HCG QUALITATIVE: NEGATIVE
HCT VFR BLD CALC: 43.4 % (ref 36–46)
HEMOGLOBIN: 14.5 G/DL (ref 12–16)
IMMATURE GRANULOCYTES: ABNORMAL %
LIPASE: 32 U/L (ref 13–60)
LYMPHOCYTES # BLD: 53 % (ref 24–44)
Lab: NORMAL
MCH RBC QN AUTO: 31.1 PG (ref 26–34)
MCHC RBC AUTO-ENTMCNC: 33.5 G/DL (ref 31–37)
MCV RBC AUTO: 92.7 FL (ref 80–100)
MONOCYTES # BLD: 2 % (ref 1–7)
MORPHOLOGY: NORMAL
MYOGLOBIN: <21 NG/ML (ref 25–58)
NRBC AUTOMATED: ABNORMAL PER 100 WBC
PDW BLD-RTO: 12.9 % (ref 12.5–15.4)
PLATELET # BLD: 328 K/UL (ref 140–450)
PLATELET ESTIMATE: ABNORMAL
PMV BLD AUTO: 9 FL (ref 6–12)
POTASSIUM SERPL-SCNC: 3.9 MMOL/L (ref 3.7–5.3)
RBC # BLD: 4.68 M/UL (ref 4–5.2)
RBC # BLD: ABNORMAL 10*6/UL
SEG NEUTROPHILS: 43 % (ref 36–66)
SEGMENTED NEUTROPHILS ABSOLUTE COUNT: 3.87 K/UL (ref 1.8–7.7)
SODIUM BLD-SCNC: 140 MMOL/L (ref 135–144)
SPECIMEN DESCRIPTION: NORMAL
STATUS: NORMAL
TROPONIN INTERP: NORMAL
TROPONIN INTERP: NORMAL
TROPONIN T: <0.03 NG/ML
TROPONIN T: <0.03 NG/ML
WBC # BLD: 9 K/UL (ref 3.5–11)
WBC # BLD: ABNORMAL 10*3/UL

## 2018-04-10 PROCEDURE — 71046 X-RAY EXAM CHEST 2 VIEWS: CPT

## 2018-04-10 PROCEDURE — 99285 EMERGENCY DEPT VISIT HI MDM: CPT

## 2018-04-10 PROCEDURE — 85379 FIBRIN DEGRADATION QUANT: CPT

## 2018-04-10 PROCEDURE — 96375 TX/PRO/DX INJ NEW DRUG ADDON: CPT

## 2018-04-10 PROCEDURE — 87804 INFLUENZA ASSAY W/OPTIC: CPT

## 2018-04-10 PROCEDURE — 80048 BASIC METABOLIC PNL TOTAL CA: CPT

## 2018-04-10 PROCEDURE — 96374 THER/PROPH/DIAG INJ IV PUSH: CPT

## 2018-04-10 PROCEDURE — 6360000002 HC RX W HCPCS: Performed by: EMERGENCY MEDICINE

## 2018-04-10 PROCEDURE — 93005 ELECTROCARDIOGRAM TRACING: CPT

## 2018-04-10 PROCEDURE — 83690 ASSAY OF LIPASE: CPT

## 2018-04-10 PROCEDURE — 84484 ASSAY OF TROPONIN QUANT: CPT

## 2018-04-10 PROCEDURE — 83874 ASSAY OF MYOGLOBIN: CPT

## 2018-04-10 PROCEDURE — 36415 COLL VENOUS BLD VENIPUNCTURE: CPT

## 2018-04-10 PROCEDURE — 85025 COMPLETE CBC W/AUTO DIFF WBC: CPT

## 2018-04-10 PROCEDURE — 84703 CHORIONIC GONADOTROPIN ASSAY: CPT

## 2018-04-10 RX ORDER — IBUPROFEN 800 MG/1
800 TABLET ORAL EVERY 8 HOURS PRN
Qty: 30 TABLET | Refills: 0 | Status: SHIPPED | OUTPATIENT
Start: 2018-04-10 | End: 2018-11-06

## 2018-04-10 RX ORDER — GABAPENTIN 300 MG/1
CAPSULE ORAL
COMMUNITY
Start: 2018-04-06 | End: 2018-11-06

## 2018-04-10 RX ORDER — BENZONATATE 200 MG/1
200 CAPSULE ORAL 3 TIMES DAILY PRN
Qty: 30 CAPSULE | Refills: 0 | Status: SHIPPED | OUTPATIENT
Start: 2018-04-10 | End: 2018-04-17

## 2018-04-10 RX ORDER — HYDROXYZINE HYDROCHLORIDE 10 MG/1
10 TABLET, FILM COATED ORAL
COMMUNITY
Start: 2017-12-08 | End: 2018-11-06

## 2018-04-10 RX ORDER — ONDANSETRON 2 MG/ML
4 INJECTION INTRAMUSCULAR; INTRAVENOUS ONCE
Status: COMPLETED | OUTPATIENT
Start: 2018-04-10 | End: 2018-04-10

## 2018-04-10 RX ORDER — KETOROLAC TROMETHAMINE 30 MG/ML
30 INJECTION, SOLUTION INTRAMUSCULAR; INTRAVENOUS ONCE
Status: COMPLETED | OUTPATIENT
Start: 2018-04-10 | End: 2018-04-10

## 2018-04-10 RX ORDER — CITALOPRAM 10 MG/1
10 TABLET ORAL
COMMUNITY
Start: 2018-01-26 | End: 2018-11-06

## 2018-04-10 RX ORDER — DULOXETIN HYDROCHLORIDE 30 MG/1
30 CAPSULE, DELAYED RELEASE ORAL
COMMUNITY
Start: 2018-04-06 | End: 2018-11-06

## 2018-04-10 RX ADMIN — ONDANSETRON 4 MG: 2 INJECTION INTRAMUSCULAR; INTRAVENOUS at 15:43

## 2018-04-10 RX ADMIN — KETOROLAC TROMETHAMINE 30 MG: 30 INJECTION, SOLUTION INTRAMUSCULAR at 15:43

## 2018-04-10 ASSESSMENT — PAIN DESCRIPTION - PAIN TYPE
TYPE: ACUTE PAIN

## 2018-04-10 ASSESSMENT — PAIN DESCRIPTION - DESCRIPTORS: DESCRIPTORS: OTHER (COMMENT)

## 2018-04-10 ASSESSMENT — ENCOUNTER SYMPTOMS
NAUSEA: 1
CONSTIPATION: 0
EYE PAIN: 0
ABDOMINAL PAIN: 0
DIARRHEA: 1
BLOOD IN STOOL: 0
SHORTNESS OF BREATH: 1
BACK PAIN: 1
VOMITING: 1
COUGH: 1

## 2018-04-10 ASSESSMENT — PAIN DESCRIPTION - ORIENTATION
ORIENTATION: MID

## 2018-04-10 ASSESSMENT — PAIN DESCRIPTION - PROGRESSION: CLINICAL_PROGRESSION: GRADUALLY WORSENING

## 2018-04-10 ASSESSMENT — PAIN SCALES - GENERAL
PAINLEVEL_OUTOF10: 6

## 2018-04-10 ASSESSMENT — PAIN DESCRIPTION - ONSET: ONSET: GRADUAL

## 2018-04-10 ASSESSMENT — PAIN DESCRIPTION - LOCATION
LOCATION: CHEST

## 2018-04-10 ASSESSMENT — PAIN DESCRIPTION - FREQUENCY: FREQUENCY: CONTINUOUS

## 2018-04-11 LAB
EKG ATRIAL RATE: 106 BPM
EKG ATRIAL RATE: 76 BPM
EKG P AXIS: 37 DEGREES
EKG P AXIS: 46 DEGREES
EKG P-R INTERVAL: 146 MS
EKG P-R INTERVAL: 156 MS
EKG Q-T INTERVAL: 354 MS
EKG Q-T INTERVAL: 400 MS
EKG QRS DURATION: 72 MS
EKG QRS DURATION: 74 MS
EKG QTC CALCULATION (BAZETT): 450 MS
EKG QTC CALCULATION (BAZETT): 470 MS
EKG R AXIS: 22 DEGREES
EKG R AXIS: 38 DEGREES
EKG T AXIS: 18 DEGREES
EKG T AXIS: 26 DEGREES
EKG VENTRICULAR RATE: 106 BPM
EKG VENTRICULAR RATE: 76 BPM

## 2018-11-06 ENCOUNTER — HOSPITAL ENCOUNTER (EMERGENCY)
Facility: CLINIC | Age: 38
Discharge: HOME | End: 2018-11-06
Attending: EMERGENCY MEDICINE
Payer: COMMERCIAL

## 2018-11-06 ENCOUNTER — APPOINTMENT (OUTPATIENT)
Dept: GENERAL RADIOLOGY | Facility: CLINIC | Age: 38
End: 2018-11-06
Payer: COMMERCIAL

## 2018-11-06 VITALS
HEART RATE: 72 BPM | TEMPERATURE: 98 F | RESPIRATION RATE: 18 BRPM | DIASTOLIC BLOOD PRESSURE: 74 MMHG | WEIGHT: 172 LBS | SYSTOLIC BLOOD PRESSURE: 112 MMHG | HEIGHT: 65 IN | BODY MASS INDEX: 28.66 KG/M2 | OXYGEN SATURATION: 95 %

## 2018-11-06 DIAGNOSIS — R07.89 CHEST WALL PAIN: Primary | ICD-10-CM

## 2018-11-06 LAB
ABSOLUTE EOS #: 0.1 K/UL (ref 0–0.4)
ABSOLUTE IMMATURE GRANULOCYTE: NORMAL K/UL (ref 0–0.3)
ABSOLUTE LYMPH #: 2.9 K/UL (ref 1–4.8)
ABSOLUTE MONO #: 0.5 K/UL (ref 0.1–1.2)
ANION GAP SERPL CALCULATED.3IONS-SCNC: 14 MMOL/L (ref 9–17)
BASOPHILS # BLD: 0 % (ref 0–2)
BASOPHILS ABSOLUTE: 0 K/UL (ref 0–0.2)
BUN BLDV-MCNC: 12 MG/DL (ref 6–20)
BUN/CREAT BLD: ABNORMAL (ref 9–20)
CALCIUM SERPL-MCNC: 9 MG/DL (ref 8.6–10.4)
CHLORIDE BLD-SCNC: 100 MMOL/L (ref 98–107)
CO2: 24 MMOL/L (ref 20–31)
CREAT SERPL-MCNC: 0.8 MG/DL (ref 0.5–0.9)
D-DIMER QUANTITATIVE: 0.21 MG/L FEU
DIFFERENTIAL TYPE: NORMAL
EKG ATRIAL RATE: 82 BPM
EKG P AXIS: 42 DEGREES
EKG P-R INTERVAL: 148 MS
EKG Q-T INTERVAL: 390 MS
EKG QRS DURATION: 74 MS
EKG QTC CALCULATION (BAZETT): 455 MS
EKG R AXIS: 29 DEGREES
EKG T AXIS: 31 DEGREES
EKG VENTRICULAR RATE: 82 BPM
EOSINOPHILS RELATIVE PERCENT: 2 % (ref 1–4)
GFR AFRICAN AMERICAN: >60 ML/MIN
GFR NON-AFRICAN AMERICAN: >60 ML/MIN
GFR SERPL CREATININE-BSD FRML MDRD: ABNORMAL ML/MIN/{1.73_M2}
GFR SERPL CREATININE-BSD FRML MDRD: ABNORMAL ML/MIN/{1.73_M2}
GLUCOSE BLD-MCNC: 122 MG/DL (ref 70–99)
HCT VFR BLD CALC: 40.8 % (ref 36–46)
HEMOGLOBIN: 13.8 G/DL (ref 12–16)
IMMATURE GRANULOCYTES: NORMAL %
LYMPHOCYTES # BLD: 34 % (ref 24–44)
MCH RBC QN AUTO: 31.5 PG (ref 26–34)
MCHC RBC AUTO-ENTMCNC: 33.7 G/DL (ref 31–37)
MCV RBC AUTO: 93.3 FL (ref 80–100)
MONOCYTES # BLD: 5 % (ref 2–11)
NRBC AUTOMATED: NORMAL PER 100 WBC
PDW BLD-RTO: 12.8 % (ref 12.5–15.4)
PLATELET # BLD: 272 K/UL (ref 140–450)
PLATELET ESTIMATE: NORMAL
PMV BLD AUTO: 7.9 FL (ref 6–12)
POTASSIUM SERPL-SCNC: 3.9 MMOL/L (ref 3.7–5.3)
RBC # BLD: 4.38 M/UL (ref 4–5.2)
RBC # BLD: NORMAL 10*6/UL
SEG NEUTROPHILS: 59 % (ref 36–66)
SEGMENTED NEUTROPHILS ABSOLUTE COUNT: 4.9 K/UL (ref 1.8–7.7)
SODIUM BLD-SCNC: 138 MMOL/L (ref 135–144)
TROPONIN INTERP: NORMAL
TROPONIN T: <0.03 NG/ML
WBC # BLD: 8.3 K/UL (ref 3.5–11)
WBC # BLD: NORMAL 10*3/UL

## 2018-11-06 PROCEDURE — 85025 COMPLETE CBC W/AUTO DIFF WBC: CPT

## 2018-11-06 PROCEDURE — 85379 FIBRIN DEGRADATION QUANT: CPT

## 2018-11-06 PROCEDURE — 93005 ELECTROCARDIOGRAM TRACING: CPT

## 2018-11-06 PROCEDURE — 80048 BASIC METABOLIC PNL TOTAL CA: CPT

## 2018-11-06 PROCEDURE — 99285 EMERGENCY DEPT VISIT HI MDM: CPT

## 2018-11-06 PROCEDURE — 84484 ASSAY OF TROPONIN QUANT: CPT

## 2018-11-06 PROCEDURE — 71045 X-RAY EXAM CHEST 1 VIEW: CPT

## 2018-11-06 RX ORDER — METAXALONE 800 MG/1
800 TABLET ORAL 3 TIMES DAILY PRN
Qty: 21 TABLET | Refills: 0 | Status: SHIPPED | OUTPATIENT
Start: 2018-11-06 | End: 2018-11-13

## 2018-11-06 RX ORDER — AMMONIUM LACTATE 12 G/100G
LOTION TOPICAL
COMMUNITY
Start: 2018-07-30 | End: 2020-03-18

## 2018-11-06 ASSESSMENT — PAIN DESCRIPTION - LOCATION: LOCATION: CHEST

## 2018-11-06 ASSESSMENT — PAIN DESCRIPTION - FREQUENCY: FREQUENCY: CONTINUOUS

## 2018-11-06 ASSESSMENT — PAIN DESCRIPTION - ORIENTATION: ORIENTATION: MID

## 2018-11-06 ASSESSMENT — PAIN DESCRIPTION - PROGRESSION: CLINICAL_PROGRESSION: RAPIDLY WORSENING

## 2018-11-06 ASSESSMENT — ENCOUNTER SYMPTOMS: BACK PAIN: 1

## 2018-11-06 ASSESSMENT — PAIN DESCRIPTION - ONSET: ONSET: GRADUAL

## 2018-11-06 ASSESSMENT — PAIN DESCRIPTION - PAIN TYPE: TYPE: ACUTE PAIN

## 2018-11-06 ASSESSMENT — PAIN DESCRIPTION - DESCRIPTORS: DESCRIPTORS: TIGHTNESS;RADIATING

## 2018-11-06 ASSESSMENT — PAIN SCALES - GENERAL: PAINLEVEL_OUTOF10: 8
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
## 2020-03-18 ENCOUNTER — APPOINTMENT (OUTPATIENT)
Dept: GENERAL RADIOLOGY | Age: 40
End: 2020-03-18
Payer: COMMERCIAL

## 2020-03-18 ENCOUNTER — HOSPITAL ENCOUNTER (EMERGENCY)
Age: 40
Discharge: HOME | End: 2020-03-18
Attending: EMERGENCY MEDICINE
Payer: COMMERCIAL

## 2020-03-18 ENCOUNTER — APPOINTMENT (OUTPATIENT)
Dept: CT IMAGING | Age: 40
End: 2020-03-18
Payer: COMMERCIAL

## 2020-03-18 VITALS
SYSTOLIC BLOOD PRESSURE: 112 MMHG | TEMPERATURE: 98.1 F | BODY MASS INDEX: 29.12 KG/M2 | HEART RATE: 79 BPM | RESPIRATION RATE: 15 BRPM | DIASTOLIC BLOOD PRESSURE: 76 MMHG | OXYGEN SATURATION: 100 % | WEIGHT: 175 LBS

## 2020-03-18 LAB
ABSOLUTE EOS #: 0.1 K/UL (ref 0–0.4)
ABSOLUTE IMMATURE GRANULOCYTE: ABNORMAL K/UL (ref 0–0.3)
ABSOLUTE LYMPH #: 2.1 K/UL (ref 1–4.8)
ABSOLUTE MONO #: 0.6 K/UL (ref 0.1–1.3)
ACETAMINOPHEN LEVEL: <5 UG/ML (ref 10–30)
ANION GAP SERPL CALCULATED.3IONS-SCNC: 12 MMOL/L (ref 9–17)
BASOPHILS # BLD: 1 % (ref 0–2)
BASOPHILS ABSOLUTE: 0.1 K/UL (ref 0–0.2)
BUN BLDV-MCNC: 16 MG/DL (ref 6–20)
BUN/CREAT BLD: NORMAL (ref 9–20)
CALCIUM SERPL-MCNC: 9.4 MG/DL (ref 8.6–10.4)
CHLORIDE BLD-SCNC: 103 MMOL/L (ref 98–107)
CO2: 24 MMOL/L (ref 20–31)
CREAT SERPL-MCNC: 0.82 MG/DL (ref 0.5–0.9)
D-DIMER QUANTITATIVE: 0.52 MG/L FEU (ref 0–0.59)
DIFFERENTIAL TYPE: ABNORMAL
EOSINOPHILS RELATIVE PERCENT: 1 % (ref 0–4)
ETHANOL PERCENT: <0.01 %
ETHANOL: <10 MG/DL
GFR AFRICAN AMERICAN: >60 ML/MIN
GFR NON-AFRICAN AMERICAN: >60 ML/MIN
GFR SERPL CREATININE-BSD FRML MDRD: NORMAL ML/MIN/{1.73_M2}
GFR SERPL CREATININE-BSD FRML MDRD: NORMAL ML/MIN/{1.73_M2}
GLUCOSE BLD-MCNC: 85 MG/DL (ref 70–99)
HCG QUALITATIVE: NEGATIVE
HCT VFR BLD CALC: 42.6 % (ref 36–46)
HEMOGLOBIN: 14.3 G/DL (ref 12–16)
IMMATURE GRANULOCYTES: ABNORMAL %
LYMPHOCYTES # BLD: 22 % (ref 24–44)
MCH RBC QN AUTO: 31.7 PG (ref 26–34)
MCHC RBC AUTO-ENTMCNC: 33.6 G/DL (ref 31–37)
MCV RBC AUTO: 94.3 FL (ref 80–100)
MONOCYTES # BLD: 7 % (ref 1–7)
NRBC AUTOMATED: ABNORMAL PER 100 WBC
PDW BLD-RTO: 13.4 % (ref 11.5–14.9)
PLATELET # BLD: 302 K/UL (ref 150–450)
PLATELET ESTIMATE: ABNORMAL
PMV BLD AUTO: 7.7 FL (ref 6–12)
POTASSIUM SERPL-SCNC: 3.9 MMOL/L (ref 3.7–5.3)
RBC # BLD: 4.51 M/UL (ref 4–5.2)
RBC # BLD: ABNORMAL 10*6/UL
SALICYLATE LEVEL: <1 MG/DL (ref 3–10)
SEG NEUTROPHILS: 69 % (ref 36–66)
SEGMENTED NEUTROPHILS ABSOLUTE COUNT: 6.4 K/UL (ref 1.3–9.1)
SODIUM BLD-SCNC: 139 MMOL/L (ref 135–144)
TOXIC TRICYCLIC SC,BLOOD: ABNORMAL
TROPONIN INTERP: NORMAL
TROPONIN T: NORMAL NG/ML
TROPONIN, HIGH SENSITIVITY: <6 NG/L (ref 0–14)
WBC # BLD: 9.3 K/UL (ref 3.5–11)
WBC # BLD: ABNORMAL 10*3/UL

## 2020-03-18 PROCEDURE — 36415 COLL VENOUS BLD VENIPUNCTURE: CPT

## 2020-03-18 PROCEDURE — 85379 FIBRIN DEGRADATION QUANT: CPT

## 2020-03-18 PROCEDURE — 84484 ASSAY OF TROPONIN QUANT: CPT

## 2020-03-18 PROCEDURE — 6370000000 HC RX 637 (ALT 250 FOR IP): Performed by: STUDENT IN AN ORGANIZED HEALTH CARE EDUCATION/TRAINING PROGRAM

## 2020-03-18 PROCEDURE — 6360000004 HC RX CONTRAST MEDICATION: Performed by: EMERGENCY MEDICINE

## 2020-03-18 PROCEDURE — G0480 DRUG TEST DEF 1-7 CLASSES: HCPCS

## 2020-03-18 PROCEDURE — 99285 EMERGENCY DEPT VISIT HI MDM: CPT

## 2020-03-18 PROCEDURE — 85025 COMPLETE CBC W/AUTO DIFF WBC: CPT

## 2020-03-18 PROCEDURE — 80307 DRUG TEST PRSMV CHEM ANLYZR: CPT

## 2020-03-18 PROCEDURE — 71046 X-RAY EXAM CHEST 2 VIEWS: CPT

## 2020-03-18 PROCEDURE — 90792 PSYCH DIAG EVAL W/MED SRVCS: CPT | Performed by: NURSE PRACTITIONER

## 2020-03-18 PROCEDURE — 93005 ELECTROCARDIOGRAM TRACING: CPT | Performed by: STUDENT IN AN ORGANIZED HEALTH CARE EDUCATION/TRAINING PROGRAM

## 2020-03-18 PROCEDURE — 71260 CT THORAX DX C+: CPT

## 2020-03-18 PROCEDURE — 84703 CHORIONIC GONADOTROPIN ASSAY: CPT

## 2020-03-18 PROCEDURE — 2580000003 HC RX 258: Performed by: EMERGENCY MEDICINE

## 2020-03-18 PROCEDURE — 80048 BASIC METABOLIC PNL TOTAL CA: CPT

## 2020-03-18 RX ORDER — IBUPROFEN 800 MG/1
800 TABLET ORAL ONCE
Status: COMPLETED | OUTPATIENT
Start: 2020-03-18 | End: 2020-03-18

## 2020-03-18 RX ORDER — BUSPIRONE HYDROCHLORIDE 10 MG/1
10 TABLET ORAL 3 TIMES DAILY PRN
Status: ON HOLD | COMMUNITY
End: 2020-08-04 | Stop reason: SDUPTHER

## 2020-03-18 RX ORDER — 0.9 % SODIUM CHLORIDE 0.9 %
80 INTRAVENOUS SOLUTION INTRAVENOUS ONCE
Status: COMPLETED | OUTPATIENT
Start: 2020-03-18 | End: 2020-03-18

## 2020-03-18 RX ORDER — NORGESTIMATE AND ETHINYL ESTRADIOL 7DAYSX3 28
1 KIT ORAL DAILY
COMMUNITY

## 2020-03-18 RX ORDER — KETOROLAC TROMETHAMINE 30 MG/ML
30 INJECTION, SOLUTION INTRAMUSCULAR; INTRAVENOUS ONCE
Status: DISCONTINUED | OUTPATIENT
Start: 2020-03-18 | End: 2020-03-18 | Stop reason: HOSPADM

## 2020-03-18 RX ORDER — CIPROFLOXACIN 500 MG/1
500 TABLET, FILM COATED ORAL 2 TIMES DAILY
COMMUNITY
Start: 2020-03-17 | End: 2020-03-19

## 2020-03-18 RX ORDER — SODIUM CHLORIDE 0.9 % (FLUSH) 0.9 %
10 SYRINGE (ML) INJECTION ONCE
Status: COMPLETED | OUTPATIENT
Start: 2020-03-18 | End: 2020-03-18

## 2020-03-18 RX ADMIN — Medication 10 ML: at 16:53

## 2020-03-18 RX ADMIN — SODIUM CHLORIDE 80 ML: 9 INJECTION, SOLUTION INTRAVENOUS at 16:53

## 2020-03-18 RX ADMIN — IBUPROFEN 800 MG: 800 TABLET, FILM COATED ORAL at 17:58

## 2020-03-18 RX ADMIN — IOPAMIDOL 75 ML: 755 INJECTION, SOLUTION INTRAVENOUS at 16:53

## 2020-03-18 ASSESSMENT — ENCOUNTER SYMPTOMS
SHORTNESS OF BREATH: 0
ABDOMINAL PAIN: 0
NAUSEA: 0
WHEEZING: 0
VOMITING: 0
COUGH: 0

## 2020-03-18 ASSESSMENT — PAIN SCALES - GENERAL
PAINLEVEL_OUTOF10: 2
PAINLEVEL_OUTOF10: 5
PAINLEVEL_OUTOF10: 6

## 2020-03-18 NOTE — ED PROVIDER NOTES
with her PCP to discuss medication adjustment. DC home. Vitals:   Vitals:    03/18/20 1529 03/18/20 1635 03/18/20 1730   BP: (!) 137/97 118/84 112/76   Pulse: 87 79 79   Resp: 12 16 15   Temp: 98.1 °F (36.7 °C)     TempSrc: Oral     SpO2: 99% 100%    Weight: 175 lb (79.4 kg)           I personally evaluated and examined the patient in conjunction with the resident and agree with the assessment, treatment plan, and disposition of the patient as recorded by the resident. I performed a history and physical examination of the patient and discussed management with the resident. I reviewed the residents note and agree with the documented findings and plan of care. Any areas of disagreement are noted on the chart. I was personally present for the key portions of any procedures. I have documented in the chart those procedures where I was not present during the key portions. I have personally reviewed all images and agree with the resident's interpretation. I have reviewed the emergency nurses triage note. I agree with the chief complaint, past medical history, past surgical history, allergies, medications, social and family history as documented unless otherwise noted.     Darryn Cooper MD  Attending Emergency Physician            Nusrat Hadley MD  03/18/20 Tootie Covert       Nusrat Hadley MD  03/18/20 2036

## 2020-03-18 NOTE — ED PROVIDER NOTES
history that includes  section; brain surgery (2016); Brain aneurysm surgery; and Dilation and curettage of uterus. Social History     Socioeconomic History    Marital status:      Spouse name: Not on file    Number of children: Not on file    Years of education: Not on file    Highest education level: Not on file   Occupational History    Not on file   Social Needs    Financial resource strain: Not on file    Food insecurity     Worry: Not on file     Inability: Not on file    Transportation needs     Medical: Not on file     Non-medical: Not on file   Tobacco Use    Smoking status: Former Smoker     Types: Cigarettes     Last attempt to quit: 2015     Years since quittin.8    Smokeless tobacco: Never Used   Substance and Sexual Activity    Alcohol use: No    Drug use: No    Sexual activity: Not on file   Lifestyle    Physical activity     Days per week: Not on file     Minutes per session: Not on file    Stress: Not on file   Relationships    Social connections     Talks on phone: Not on file     Gets together: Not on file     Attends [employer] service: Not on file     Active member of club or organization: Not on file     Attends meetings of clubs or organizations: Not on file     Relationship status: Not on file    Intimate partner violence     Fear of current or ex partner: Not on file     Emotionally abused: Not on file     Physically abused: Not on file     Forced sexual activity: Not on file   Other Topics Concern    Not on file   Social History Narrative    Not on file       History reviewed. No pertinent family history. Allergies:  Doxycycline; Hydroxyzine hcl; Prednisone; and Penicillins    Home Medications:  Prior to Admission medications    Medication Sig Start Date End Date Taking?  Authorizing Provider   busPIRone (BUSPAR) 10 MG tablet Take 10 mg by mouth 3 times daily as needed   Yes Historical Provider, MD   ciprofloxacin (CIPRO) 500 MG tablet Take 500 mg by mouth 2 times daily For 3 days starting 3/17/20 3/17/20 3/19/20 Yes Historical Provider, MD   terconazole (TERAZOL 7) 0.4 % vaginal cream Place 1 applicator vaginally nightly For 7 days starting 3/17/20 3/17/20 3/24/20 Yes Historical Provider, MD   Norgestim-Eth Bennye Rhein Triphasic (ORTHO TRI-CYCLEN, 28,) 0.18/0.215/0.25 MG-35 MCG TABS Take 1 tablet by mouth daily   Yes Historical Provider, MD   metFORMIN (GLUCOPHAGE) 500 MG tablet Take 500 mg by mouth 2 times daily (with meals)   Yes Historical Provider, MD   vitamin D (CHOLECALCIFEROL) 1000 UNIT TABS tablet Take 1,000 Units by mouth daily   Yes Historical Provider, MD       REVIEW OFSYSTEMS    (2-9 systems for level 4, 10 or more for level 5)      Review of Systems   Constitutional: Negative for chills and fever. HENT: Negative. Respiratory: Negative for cough, shortness of breath and wheezing. Cardiovascular: Positive for chest pain. Negative for palpitations and leg swelling. Gastrointestinal: Negative for abdominal pain, nausea and vomiting. Genitourinary: Negative for dysuria and hematuria. Skin: Negative for rash. Neurological: Negative for weakness, light-headedness and numbness. Psychiatric/Behavioral: The patient is nervous/anxious. PHYSICAL EXAM   (up to 7 for level 4, 8 or more forlevel 5)      INITIAL VITALS:   ED Triage Vitals [03/18/20 1529]   BP Temp Temp Source Pulse Resp SpO2 Height Weight   (!) 137/97 98.1 °F (36.7 °C) Oral 87 12 99 % -- 175 lb (79.4 kg)       Physical Exam  Vitals signs and nursing note reviewed. Constitutional:       General: She is not in acute distress. Appearance: Normal appearance. She is not ill-appearing, toxic-appearing or diaphoretic. HENT:      Head: Normocephalic and atraumatic. Cardiovascular:      Rate and Rhythm: Normal rate and regular rhythm. Heart sounds: No murmur. No gallop. Pulmonary:      Effort: Pulmonary effort is normal. No respiratory distress.       Breath medically clear. DIAGNOSTIC RESULTS / EMERGENCY DEPARTMENT COURSE / MDM     LABS:  Labs Reviewed   CBC WITH AUTO DIFFERENTIAL - Abnormal; Notable for the following components:       Result Value    Seg Neutrophils 69 (*)     Lymphocytes 22 (*)     All other components within normal limits   TOX SCR, BLD, ED - Abnormal; Notable for the following components:    Salicylate Lvl <1 (*)     Acetaminophen Level <5 (*)     All other components within normal limits   BASIC METABOLIC PANEL   TROPONIN   D-DIMER, QUANTITATIVE   HCG, SERUM, QUALITATIVE   DRUG SCREEN TRICYCLIC URINE         RADIOLOGY:  Xr Chest Standard (2 Vw)    Result Date: 3/18/2020  EXAMINATION: TWO XRAY VIEWS OF THE CHEST 3/18/2020 4:24 pm COMPARISON: 11/06/2018 HISTORY: ORDERING SYSTEM PROVIDED HISTORY: chest pain TECHNOLOGIST PROVIDED HISTORY: chest pain Reason for Exam: Chest pains x 3 days Acuity: Acute Type of Exam: Initial FINDINGS: Heart size and pulmonary vessels are within normal limits. Lungs remain essentially clear. No new focal infiltrates are seen. No significant pleural effusions. Unchanged slight curvature of the visualized thoracolumbar spine. No acute osseous abnormality is seen. Overall appearance is similar to the prior exam.  Monitor leads overlie the chest.     Stable chest with no acute parenchymal abnormality demonstrated. Ct Chest Pulmonary Embolism W Contrast    Result Date: 3/18/2020  EXAMINATION: CTA OF THE CHEST 3/18/2020 3:47 pm TECHNIQUE: CTA of the chest was performed after the administration of intravenous contrast.  Multiplanar reformatted images are provided for review. MIP images are provided for review. Dose modulation, iterative reconstruction, and/or weight based adjustment of the mA/kV was utilized to reduce the radiation dose to as low as reasonably achievable.  COMPARISON: Chest x-ray dated March 18, 2020, prior CT scan dated October 4, 2017 HISTORY: ORDERING SYSTEM PROVIDED HISTORY: CP, elevated dimer

## 2020-03-19 LAB
EKG ATRIAL RATE: 80 BPM
EKG P AXIS: 41 DEGREES
EKG P-R INTERVAL: 142 MS
EKG Q-T INTERVAL: 376 MS
EKG QRS DURATION: 82 MS
EKG QTC CALCULATION (BAZETT): 433 MS
EKG R AXIS: 19 DEGREES
EKG T AXIS: 24 DEGREES
EKG VENTRICULAR RATE: 80 BPM
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
## 2020-03-19 NOTE — PROGRESS NOTES
Medication History completed:    New medications: terconazole, Ortho tri-cyclen, metformin, ciprofloxacin, buspirone    Medications discontinued: ibuprofen, ammonium lactate lotion    Changes to dosing: none    Stated allergies: As listed    Other pertinent information: Medications confirmed with [employer]. The patient began a 3 day course of ciprofloxacin and a 7 day course of terconazole vaginal cream on 3/17/20.      Thank you,  Isacc Vela, PharmD, BCPS  [phone]
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
## 2020-06-25 ENCOUNTER — HOSPITAL ENCOUNTER (EMERGENCY)
Age: 40
Discharge: HOME | End: 2020-06-25
Attending: EMERGENCY MEDICINE
Payer: COMMERCIAL

## 2020-06-25 ENCOUNTER — APPOINTMENT (OUTPATIENT)
Dept: CT IMAGING | Age: 40
End: 2020-06-25
Payer: COMMERCIAL

## 2020-06-25 VITALS
TEMPERATURE: 98.4 F | DIASTOLIC BLOOD PRESSURE: 80 MMHG | RESPIRATION RATE: 12 BRPM | SYSTOLIC BLOOD PRESSURE: 148 MMHG | WEIGHT: 174 LBS | HEIGHT: 64 IN | BODY MASS INDEX: 29.71 KG/M2 | OXYGEN SATURATION: 98 % | HEART RATE: 88 BPM

## 2020-06-25 PROCEDURE — 70450 CT HEAD/BRAIN W/O DYE: CPT

## 2020-06-25 PROCEDURE — 6370000000 HC RX 637 (ALT 250 FOR IP): Performed by: STUDENT IN AN ORGANIZED HEALTH CARE EDUCATION/TRAINING PROGRAM

## 2020-06-25 PROCEDURE — 99283 EMERGENCY DEPT VISIT LOW MDM: CPT

## 2020-06-25 PROCEDURE — 72125 CT NECK SPINE W/O DYE: CPT

## 2020-06-25 RX ORDER — ACETAMINOPHEN 325 MG/1
650 TABLET ORAL ONCE
Status: COMPLETED | OUTPATIENT
Start: 2020-06-25 | End: 2020-06-25

## 2020-06-25 RX ADMIN — ACETAMINOPHEN 650 MG: 325 TABLET ORAL at 14:18

## 2020-06-25 ASSESSMENT — ENCOUNTER SYMPTOMS
NAUSEA: 0
BACK PAIN: 0
ABDOMINAL PAIN: 0
SHORTNESS OF BREATH: 0

## 2020-06-25 ASSESSMENT — PAIN DESCRIPTION - PROGRESSION: CLINICAL_PROGRESSION: GRADUALLY WORSENING

## 2020-06-25 ASSESSMENT — PAIN SCALES - GENERAL
PAINLEVEL_OUTOF10: 6
PAINLEVEL_OUTOF10: 6

## 2020-06-25 ASSESSMENT — PAIN DESCRIPTION - LOCATION: LOCATION: HEAD

## 2020-06-25 ASSESSMENT — PAIN DESCRIPTION - ONSET: ONSET: SUDDEN

## 2020-06-25 ASSESSMENT — PAIN - FUNCTIONAL ASSESSMENT: PAIN_FUNCTIONAL_ASSESSMENT: PREVENTS OR INTERFERES SOME ACTIVE ACTIVITIES AND ADLS

## 2020-06-25 ASSESSMENT — PAIN DESCRIPTION - PAIN TYPE: TYPE: ACUTE PAIN

## 2020-06-25 ASSESSMENT — PAIN DESCRIPTION - DESCRIPTORS: DESCRIPTORS: ACHING;PRESSURE;SHARP
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
## 2020-07-18 ENCOUNTER — HOSPITAL ENCOUNTER (EMERGENCY)
Age: 40
Discharge: HOME | End: 2020-07-18
Attending: EMERGENCY MEDICINE
Payer: COMMERCIAL

## 2020-07-18 VITALS
SYSTOLIC BLOOD PRESSURE: 125 MMHG | DIASTOLIC BLOOD PRESSURE: 86 MMHG | HEIGHT: 64 IN | WEIGHT: 170 LBS | RESPIRATION RATE: 17 BRPM | TEMPERATURE: 98.9 F | HEART RATE: 95 BPM | OXYGEN SATURATION: 100 % | BODY MASS INDEX: 29.02 KG/M2

## 2020-07-18 LAB
ABSOLUTE EOS #: <0.03 K/UL (ref 0–0.44)
ABSOLUTE IMMATURE GRANULOCYTE: <0.03 K/UL (ref 0–0.3)
ABSOLUTE LYMPH #: 1.82 K/UL (ref 1.1–3.7)
ABSOLUTE MONO #: 0.34 K/UL (ref 0.1–1.2)
ALBUMIN SERPL-MCNC: 4 G/DL (ref 3.5–5.2)
ALBUMIN/GLOBULIN RATIO: 1.3 (ref 1–2.5)
ALP BLD-CCNC: 66 U/L (ref 35–104)
ALT SERPL-CCNC: 17 U/L (ref 5–33)
ANION GAP SERPL CALCULATED.3IONS-SCNC: 13 MMOL/L (ref 9–17)
AST SERPL-CCNC: 17 U/L
BASOPHILS # BLD: 0 % (ref 0–2)
BASOPHILS ABSOLUTE: <0.03 K/UL (ref 0–0.2)
BILIRUB SERPL-MCNC: 0.53 MG/DL (ref 0.3–1.2)
BILIRUBIN URINE: NEGATIVE
BUN BLDV-MCNC: 8 MG/DL (ref 6–20)
BUN/CREAT BLD: NORMAL (ref 9–20)
CALCIUM SERPL-MCNC: 8.7 MG/DL (ref 8.6–10.4)
CHLORIDE BLD-SCNC: 103 MMOL/L (ref 98–107)
CO2: 22 MMOL/L (ref 20–31)
COLOR: YELLOW
COMMENT UA: ABNORMAL
CREAT SERPL-MCNC: 0.71 MG/DL (ref 0.5–0.9)
DIFFERENTIAL TYPE: ABNORMAL
EOSINOPHILS RELATIVE PERCENT: 0 % (ref 1–4)
GFR AFRICAN AMERICAN: >60 ML/MIN
GFR NON-AFRICAN AMERICAN: >60 ML/MIN
GFR SERPL CREATININE-BSD FRML MDRD: NORMAL ML/MIN/{1.73_M2}
GFR SERPL CREATININE-BSD FRML MDRD: NORMAL ML/MIN/{1.73_M2}
GLUCOSE BLD-MCNC: 87 MG/DL (ref 70–99)
GLUCOSE URINE: NEGATIVE
HCG QUALITATIVE: NEGATIVE
HCT VFR BLD CALC: 43.2 % (ref 36.3–47.1)
HEMOGLOBIN: 14.3 G/DL (ref 11.9–15.1)
IMMATURE GRANULOCYTES: 0 %
KETONES, URINE: NEGATIVE
LEUKOCYTE ESTERASE, URINE: NEGATIVE
LIPASE: 25 U/L (ref 13–60)
LYMPHOCYTES # BLD: 29 % (ref 24–43)
MCH RBC QN AUTO: 31.1 PG (ref 25.2–33.5)
MCHC RBC AUTO-ENTMCNC: 33.1 G/DL (ref 28.4–34.8)
MCV RBC AUTO: 93.9 FL (ref 82.6–102.9)
MONOCYTES # BLD: 5 % (ref 3–12)
NITRITE, URINE: NEGATIVE
NRBC AUTOMATED: 0 PER 100 WBC
PDW BLD-RTO: 12.2 % (ref 11.8–14.4)
PH UA: 6.5 (ref 5–8)
PLATELET # BLD: 278 K/UL (ref 138–453)
PLATELET ESTIMATE: ABNORMAL
PMV BLD AUTO: 9.7 FL (ref 8.1–13.5)
POTASSIUM SERPL-SCNC: 3.8 MMOL/L (ref 3.7–5.3)
PROTEIN UA: NEGATIVE
RBC # BLD: 4.6 M/UL (ref 3.95–5.11)
RBC # BLD: ABNORMAL 10*6/UL
SEG NEUTROPHILS: 66 % (ref 36–65)
SEGMENTED NEUTROPHILS ABSOLUTE COUNT: 4.2 K/UL (ref 1.5–8.1)
SODIUM BLD-SCNC: 138 MMOL/L (ref 135–144)
SPECIFIC GRAVITY UA: 1 (ref 1–1.03)
TOTAL PROTEIN: 7 G/DL (ref 6.4–8.3)
TURBIDITY: CLEAR
URINE HGB: NEGATIVE
UROBILINOGEN, URINE: NORMAL
WBC # BLD: 6.4 K/UL (ref 3.5–11.3)
WBC # BLD: ABNORMAL 10*3/UL

## 2020-07-18 PROCEDURE — 83690 ASSAY OF LIPASE: CPT

## 2020-07-18 PROCEDURE — 80053 COMPREHEN METABOLIC PANEL: CPT

## 2020-07-18 PROCEDURE — 6370000000 HC RX 637 (ALT 250 FOR IP): Performed by: STUDENT IN AN ORGANIZED HEALTH CARE EDUCATION/TRAINING PROGRAM

## 2020-07-18 PROCEDURE — 84703 CHORIONIC GONADOTROPIN ASSAY: CPT

## 2020-07-18 PROCEDURE — 99284 EMERGENCY DEPT VISIT MOD MDM: CPT

## 2020-07-18 PROCEDURE — 81003 URINALYSIS AUTO W/O SCOPE: CPT

## 2020-07-18 PROCEDURE — 96361 HYDRATE IV INFUSION ADD-ON: CPT

## 2020-07-18 PROCEDURE — 96374 THER/PROPH/DIAG INJ IV PUSH: CPT

## 2020-07-18 PROCEDURE — 2580000003 HC RX 258: Performed by: STUDENT IN AN ORGANIZED HEALTH CARE EDUCATION/TRAINING PROGRAM

## 2020-07-18 PROCEDURE — 6360000002 HC RX W HCPCS: Performed by: STUDENT IN AN ORGANIZED HEALTH CARE EDUCATION/TRAINING PROGRAM

## 2020-07-18 PROCEDURE — 85025 COMPLETE CBC W/AUTO DIFF WBC: CPT

## 2020-07-18 RX ORDER — MAGNESIUM HYDROXIDE/ALUMINUM HYDROXICE/SIMETHICONE 120; 1200; 1200 MG/30ML; MG/30ML; MG/30ML
30 SUSPENSION ORAL ONCE
Status: COMPLETED | OUTPATIENT
Start: 2020-07-18 | End: 2020-07-18

## 2020-07-18 RX ORDER — SODIUM CHLORIDE, SODIUM LACTATE, POTASSIUM CHLORIDE, CALCIUM CHLORIDE 600; 310; 30; 20 MG/100ML; MG/100ML; MG/100ML; MG/100ML
1000 INJECTION, SOLUTION INTRAVENOUS ONCE
Status: COMPLETED | OUTPATIENT
Start: 2020-07-18 | End: 2020-07-18

## 2020-07-18 RX ORDER — ACETAMINOPHEN 500 MG
1000 TABLET ORAL ONCE
Status: COMPLETED | OUTPATIENT
Start: 2020-07-18 | End: 2020-07-18

## 2020-07-18 RX ORDER — ONDANSETRON 4 MG/1
4 TABLET, FILM COATED ORAL EVERY 8 HOURS PRN
Qty: 20 TABLET | Refills: 0 | Status: ON HOLD | OUTPATIENT
Start: 2020-07-18 | End: 2020-08-04 | Stop reason: SDUPTHER

## 2020-07-18 RX ORDER — ONDANSETRON 2 MG/ML
4 INJECTION INTRAMUSCULAR; INTRAVENOUS ONCE
Status: COMPLETED | OUTPATIENT
Start: 2020-07-18 | End: 2020-07-18

## 2020-07-18 RX ADMIN — ALUMINUM HYDROXIDE, MAGNESIUM HYDROXIDE, AND SIMETHICONE 30 ML: 200; 200; 20 SUSPENSION ORAL at 20:46

## 2020-07-18 RX ADMIN — ONDANSETRON 4 MG: 2 INJECTION INTRAMUSCULAR; INTRAVENOUS at 20:48

## 2020-07-18 RX ADMIN — ACETAMINOPHEN 1000 MG: 500 TABLET ORAL at 23:14

## 2020-07-18 RX ADMIN — SODIUM CHLORIDE, POTASSIUM CHLORIDE, SODIUM LACTATE AND CALCIUM CHLORIDE 1000 ML: 600; 310; 30; 20 INJECTION, SOLUTION INTRAVENOUS at 20:46

## 2020-07-18 ASSESSMENT — PAIN SCALES - GENERAL: PAINLEVEL_OUTOF10: 6

## 2020-07-19 ASSESSMENT — ENCOUNTER SYMPTOMS
DIARRHEA: 1
ABDOMINAL PAIN: 1
CONSTIPATION: 0
NAUSEA: 1
TROUBLE SWALLOWING: 0
VOMITING: 0
SHORTNESS OF BREATH: 0
SORE THROAT: 0

## 2020-07-19 NOTE — ED PROVIDER NOTES
[facility]     Emergency Department     Faculty Attestation    I performed a history and physical examination of the patient and discussed management with the resident. I reviewed the residents note and agree with the documented findings and plan of care. Any areas of disagreement are noted on the chart. I was personally present for the key portions of any procedures. I have documented in the chart those procedures where I was not present during the key portions. I have reviewed the emergency nurses triage note. I agree with the chief complaint, past medical history, past surgical history, allergies, medications, social and family history as documented unless otherwise noted below. For Physician Assistant/ Nurse Practitioner cases/documentation I have personally evaluated this patient and have completed at least one if not all key elements of the E/M (history, physical exam, and MDM). Additional findings are as noted. I have personally seen and evaluated the patient. I find the patient's history and physical exam are consistent with the NP/PA documentation. I agree with the care provided, treatment rendered, disposition and follow-up plan. Minimal llq tenderness no rebound       Critical Care     Kan Purdy M.D.   Attending Emergency  Physician              Janet Arzola MD  07/18/20 0157

## 2020-07-19 NOTE — ED NOTES
Pt to Ed with c/o mid abd pain, headache and diarrhea. Pt state she has been seeing a specialist for it, has been taking anitdiarrhea medication without helping. Pt states she has lack of appetite, mild cough. Pt denies vomiting today, just states headache and abd pain isn't getting better and everything she eats \"goes through her\". Pt in NAD, rr even and unlabored.       Abdirashid Bear RN  07/18/20 2021

## 2020-07-19 NOTE — ED PROVIDER NOTES
[facility]  ED  Emergency Department Encounter  EmergencyMedicine Resident     Pt Claremarianne Jamil Garvey  MRN: [id number]  [place] 1980  Date of evaluation: 20  PCP:  LISA Troy CNP    CHIEF COMPLAINT       Chief Complaint   Patient presents with    Abdominal Pain     x 1 week    Diarrhea     x 1 week    Headache       HISTORY OF PRESENT ILLNESS  (Location/Symptom, Timing/Onset, Context/Setting, Quality, Duration, Modifying Factors, Severity.)      Ryan Collins is a 44 y.o. female history recurrent diarrhea and chronic abdominal pain who presents with 1 week abdominal pain with diarrhea and headache. Patient has exposure to COVID-19 and has been quarantining but is concerned about her symptoms. She has been able to hydrate at home but not tolerate p.o. nutrition. Not accompanied by fevers, chills, vomiting, chest pain, shortness of breath, decreased urination. PAST MEDICAL / SURGICAL / SOCIAL / FAMILY HISTORY      has a past medical history of Anxiety, Cerebral aneurysm, Chronic left-sided low back pain, Chronic neck pain, Depression, Gallstones, and Headache.     has a past surgical history that includes  section; brain surgery (2016); Brain aneurysm surgery; and Dilation and curettage of uterus.     Social History     Socioeconomic History    Marital status:      Spouse name: Not on file    Number of children: Not on file    Years of education: Not on file    Highest education level: Not on file   Occupational History    Not on file   Social Needs    Financial resource strain: Not on file    Food insecurity     Worry: Not on file     Inability: Not on file    Transportation needs     Medical: Not on file     Non-medical: Not on file   Tobacco Use    Smoking status: Former Smoker     Types: Cigarettes     Last attempt to quit: 2015     Years since quittin.1    Smokeless tobacco: Never Used   Substance and Sexual Activity    Alcohol use: No    Drug use: No    Sexual activity: Not on file   Lifestyle    Physical activity     Days per week: Not on file     Minutes per session: Not on file    Stress: Not on file   Relationships    Social connections     Talks on phone: Not on file     Gets together: Not on file     Attends Latter-day service: Not on file     Active member of club or organization: Not on file     Attends meetings of clubs or organizations: Not on file     Relationship status: Not on file    Intimate partner violence     Fear of current or ex partner: Not on file     Emotionally abused: Not on file     Physically abused: Not on file     Forced sexual activity: Not on file   Other Topics Concern    Not on file   Social History Narrative    Not on file       History reviewed. No pertinent family history. Allergies:  Doxycycline; Hydroxyzine hcl; Prednisone; and Penicillins    Home Medications:  Prior to Admission medications    Medication Sig Start Date End Date Taking? Authorizing Provider   ondansetron (ZOFRAN) 4 MG tablet Take 1 tablet by mouth every 8 hours as needed for Nausea 7/18/20  Yes Braden Sandhu MD   busPIRone (BUSPAR) 10 MG tablet Take 10 mg by mouth 3 times daily as needed    Historical Provider, MD   Norgestim-Eth Estrad Triphasic (ORTHO TRI-CYCLEN, 28,) 0.18/0.215/0.25 MG-35 MCG TABS Take 1 tablet by mouth daily    Historical Provider, MD   metFORMIN (GLUCOPHAGE) 500 MG tablet Take 500 mg by mouth 2 times daily (with meals)    Historical Provider, MD   vitamin D (CHOLECALCIFEROL) 1000 UNIT TABS tablet Take 1,000 Units by mouth daily    Historical Provider, MD       REVIEW OF SYSTEMS    (2-9 systems for level 4, 10 or more for level 5)      Review of Systems   Constitutional: Negative for chills and fever. HENT: Negative for sore throat and trouble swallowing. Respiratory: Negative for shortness of breath. Cardiovascular: Negative for chest pain.    Gastrointestinal: Positive for abdominal pain, diarrhea and nausea. Negative for constipation and vomiting. Genitourinary: Negative for dysuria and vaginal discharge. Musculoskeletal: Negative for arthralgias and myalgias. Skin: Negative for wound. Neurological: Positive for headaches. Negative for light-headedness. Psychiatric/Behavioral: Negative for behavioral problems. PHYSICAL EXAM   (up to 7 for level 4, 8 or more for level 5)      INITIAL VITALS:   /86   Pulse 95   Temp 98.9 °F (37.2 °C) (Oral)   Resp 17   Ht 5' 4\" (1.626 m)   Wt 170 lb (77.1 kg)   SpO2 100%   BMI 29.18 kg/m²     Physical Exam  Vitals signs and nursing note reviewed. Constitutional:       General: She is not in acute distress. Appearance: Normal appearance. She is well-developed. She is not diaphoretic. HENT:      Head: Normocephalic and atraumatic. Right Ear: External ear normal.      Left Ear: External ear normal.      Nose: Nose normal.      Mouth/Throat:      Pharynx: Uvula midline. No oropharyngeal exudate. Eyes:      General:         Right eye: No discharge. Left eye: No discharge. Conjunctiva/sclera: Conjunctivae normal.      Pupils: Pupils are equal, round, and reactive to light. Neck:      Musculoskeletal: Normal range of motion. Cardiovascular:      Rate and Rhythm: Normal rate and regular rhythm. Heart sounds: Normal heart sounds. No murmur. Pulmonary:      Effort: Pulmonary effort is normal. No respiratory distress. Abdominal:      Palpations: Abdomen is soft. Tenderness: There is abdominal tenderness (Generalized). Musculoskeletal: Normal range of motion. General: No deformity. Skin:     General: Skin is warm and dry. Capillary Refill: Capillary refill takes less than 2 seconds. Neurological:      Mental Status: She is alert and oriented to person, place, and time.    Psychiatric:         Behavior: Behavior normal.         DIFFERENTIAL  DIAGNOSIS     PLAN (Fatimah Shoemaker / Florinda Ghotra / EKG):  Orders Placed This Encounter   Procedures    CBC Auto Differential    Comprehensive Metabolic Panel w/ Reflex to MG    Lipase    Blood Gas, Arterial    Urinalysis, reflex to microscopic    HCG Qualitative, Serum    Place CT Compatible peripheral IV       MEDICATIONS ORDERED:  Orders Placed This Encounter   Medications    lactated ringers infusion 1,000 mL    aluminum & magnesium hydroxide-simethicone (MAALOX) 200-200-20 MG/5ML suspension 30 mL    ondansetron (ZOFRAN) injection 4 mg    acetaminophen (TYLENOL) tablet 1,000 mg    ondansetron (ZOFRAN) 4 MG tablet     Sig: Take 1 tablet by mouth every 8 hours as needed for Nausea     Dispense:  20 tablet     Refill:  0       DDX: Gastroenteritis versus other viral infection versus GERD versus PUD versus appendicitis versus dehydration versus pregnancy/ectopic pregnancy versus acute on chronic condition versus other    DIAGNOSTIC RESULTS / EMERGENCY DEPARTMENT COURSE / MDM     LABS:  Results for orders placed or performed during the hospital encounter of 07/18/20   CBC Auto Differential   Result Value Ref Range    WBC 6.4 3.5 - 11.3 k/uL    RBC 4.60 3.95 - 5.11 m/uL    Hemoglobin 14.3 11.9 - 15.1 g/dL    Hematocrit 43.2 36.3 - 47.1 %    MCV 93.9 82.6 - 102.9 fL    MCH 31.1 25.2 - 33.5 pg    MCHC 33.1 28.4 - 34.8 g/dL    RDW 12.2 11.8 - 14.4 %    Platelets 665 486 - 311 k/uL    MPV 9.7 8.1 - 13.5 fL    NRBC Automated 0.0 0.0 per 100 WBC    Differential Type NOT REPORTED     Seg Neutrophils 66 (H) 36 - 65 %    Lymphocytes 29 24 - 43 %    Monocytes 5 3 - 12 %    Eosinophils % 0 (L) 1 - 4 %    Basophils 0 0 - 2 %    Immature Granulocytes 0 0 %    Segs Absolute 4.20 1.50 - 8.10 k/uL    Absolute Lymph # 1.82 1.10 - 3.70 k/uL    Absolute Mono # 0.34 0.10 - 1.20 k/uL    Absolute Eos # <0.03 0.00 - 0.44 k/uL    Basophils Absolute <0.03 0.00 - 0.20 k/uL    Absolute Immature Granulocyte <0.03 0.00 - 0.30 k/uL    WBC Morphology NOT REPORTED     RBC Morphology NOT REPORTED     Platelet Estimate NOT REPORTED    Comprehensive Metabolic Panel w/ Reflex to MG   Result Value Ref Range    Glucose 87 70 - 99 mg/dL    BUN 8 6 - 20 mg/dL    CREATININE 0.71 0.50 - 0.90 mg/dL    Bun/Cre Ratio NOT REPORTED 9 - 20    Calcium 8.7 8.6 - 10.4 mg/dL    Sodium 138 135 - 144 mmol/L    Potassium 3.8 3.7 - 5.3 mmol/L    Chloride 103 98 - 107 mmol/L    CO2 22 20 - 31 mmol/L    Anion Gap 13 9 - 17 mmol/L    Alkaline Phosphatase 66 35 - 104 U/L    ALT 17 5 - 33 U/L    AST 17 <32 U/L    Total Bilirubin 0.53 0.3 - 1.2 mg/dL    Total Protein 7.0 6.4 - 8.3 g/dL    Alb 4.0 3.5 - 5.2 g/dL    Albumin/Globulin Ratio 1.3 1.0 - 2.5    GFR Non-African American >60 >60 mL/min    GFR African American >60 >60 mL/min    GFR Comment          GFR Staging NOT REPORTED    Lipase   Result Value Ref Range    Lipase 25 13 - 60 U/L   Urinalysis, reflex to microscopic   Result Value Ref Range    Color, UA YELLOW YELLOW    Turbidity UA CLEAR CLEAR    Glucose, Ur NEGATIVE NEGATIVE    Bilirubin Urine NEGATIVE NEGATIVE    Ketones, Urine NEGATIVE NEGATIVE    Specific Gravity, UA 1.004 (L) 1.005 - 1.030    Urine Hgb NEGATIVE NEGATIVE    pH, UA 6.5 5.0 - 8.0    Protein, UA NEGATIVE NEGATIVE    Urobilinogen, Urine Normal Normal    Nitrite, Urine NEGATIVE NEGATIVE    Leukocyte Esterase, Urine NEGATIVE NEGATIVE    Urinalysis Comments       Microscopic exam not performed based on chemical results unless requested in original order. HCG Qualitative, Serum   Result Value Ref Range    hCG Qual NEGATIVE NEGATIVE         RADIOLOGY:  No results found. EKG  None    All EKG's are interpreted by the Emergency Department Physician who either signs or Co-signs this chart in the absence of a cardiologist.    EMERGENCY DEPARTMENT COURSE:  Patient found resting comfortably in bed, uncomfortable appearing but not ill or toxic. Engaged and cooperative in exam.  Physical exam notable for generalized abdominal tenderness.   Normotensive, afebrile, non-tachycardic, otherwise well-appearing and reassuring. Given duration of patient's symptoms plan to check basic labs, Zofran, Maalox, Tylenol and fluids for symptomatic management and reassess. No acute lab abnormalities, not pregnant, UA unremarkable. On further evaluation of patient she notes that abdominal pain and diarrhea is a chronic condition she deals with, follows with GI, and is on multiple medications that she cannot remember and are not listed in her chart for her symptoms. Given the negative work-up believe this to be an acute exacerbation of patient's chronic condition. As such advised for symptomatic management and outpatient GI follow-up. Patient in agreement with plan and reassured by negative work-up. Discharge plan discussed with patient who is in agreement. Educated on likely pathology, medications, return precautions, and follow-up. Patient understood all educated materials with all questions answered to their satisfaction. PROCEDURES:  None    CONSULTS:  None    CRITICAL CARE:  None    FINAL IMPRESSION      1.  Diarrhea, unspecified type          DISPOSITION / PLAN     DISPOSITION Decision To Discharge 07/18/2020 10:44:01 PM      PATIENT REFERRED TO:  LISA Ann - CNP  [facility]  [place] [address] [place] [address]  [phone]    Call   Regarding this visit    [facility]  [address] [place] [address]  [phone]  Go to   As needed, If symptoms worsen    Your gastroenterologist    Call   Regarding this visit      DISCHARGE MEDICATIONS:  Discharge Medication List as of 7/18/2020 10:57 PM      START taking these medications    Details   ondansetron (ZOFRAN) 4 MG tablet Take 1 tablet by mouth every 8 hours as needed for Nausea, Disp-20 tablet,R-0Print             Arielle William MD  Emergency Medicine Resident    (Please note that portions of thisnote were completed with a voice recognition program.  Efforts were made to edit the dictations but occasionally words are mis-transcribed.)       Hebert Galvez MD  Resident  07/19/20 3009

## 2020-07-30 ENCOUNTER — HOSPITAL ENCOUNTER (INPATIENT)
Age: 40
LOS: 5 days | Discharge: HOME | DRG: 885 | End: 2020-08-04
Attending: EMERGENCY MEDICINE | Admitting: PSYCHIATRY & NEUROLOGY
Payer: COMMERCIAL

## 2020-07-30 PROBLEM — F32.A DEPRESSION WITH SUICIDAL IDEATION: Status: ACTIVE | Noted: 2020-07-30

## 2020-07-30 PROBLEM — R45.851 DEPRESSION WITH SUICIDAL IDEATION: Status: ACTIVE | Noted: 2020-07-30

## 2020-07-30 PROBLEM — F32.9 MAJOR DEPRESSION, CHRONIC: Status: ACTIVE | Noted: 2020-07-30

## 2020-07-30 LAB
SARS-COV-2, PCR: NORMAL
SARS-COV-2, RAPID: NOT DETECTED
SARS-COV-2: NORMAL
SOURCE: NORMAL

## 2020-07-30 PROCEDURE — 99285 EMERGENCY DEPT VISIT HI MDM: CPT

## 2020-07-30 PROCEDURE — 1240000000 HC EMOTIONAL WELLNESS R&B

## 2020-07-30 PROCEDURE — 6370000000 HC RX 637 (ALT 250 FOR IP): Performed by: NURSE PRACTITIONER

## 2020-07-30 PROCEDURE — 6370000000 HC RX 637 (ALT 250 FOR IP): Performed by: EMERGENCY MEDICINE

## 2020-07-30 PROCEDURE — 99253 IP/OBS CNSLTJ NEW/EST LOW 45: CPT | Performed by: INTERNAL MEDICINE

## 2020-07-30 PROCEDURE — U0002 COVID-19 LAB TEST NON-CDC: HCPCS

## 2020-07-30 PROCEDURE — 93005 ELECTROCARDIOGRAM TRACING: CPT | Performed by: PSYCHIATRY & NEUROLOGY

## 2020-07-30 PROCEDURE — 99223 1ST HOSP IP/OBS HIGH 75: CPT | Performed by: PSYCHIATRY & NEUROLOGY

## 2020-07-30 PROCEDURE — 6370000000 HC RX 637 (ALT 250 FOR IP): Performed by: PSYCHIATRY & NEUROLOGY

## 2020-07-30 RX ORDER — HALOPERIDOL 5 MG/ML
5 INJECTION INTRAMUSCULAR EVERY 4 HOURS PRN
Status: DISCONTINUED | OUTPATIENT
Start: 2020-07-30 | End: 2020-08-04 | Stop reason: HOSPADM

## 2020-07-30 RX ORDER — HALOPERIDOL 10 MG/1
5 TABLET ORAL EVERY 4 HOURS PRN
Status: DISCONTINUED | OUTPATIENT
Start: 2020-07-30 | End: 2020-08-04 | Stop reason: HOSPADM

## 2020-07-30 RX ORDER — NICOTINE 21 MG/24HR
1 PATCH, TRANSDERMAL 24 HOURS TRANSDERMAL DAILY
Status: DISCONTINUED | OUTPATIENT
Start: 2020-07-30 | End: 2020-07-30

## 2020-07-30 RX ORDER — HYDROXYZINE 50 MG/1
50 TABLET, FILM COATED ORAL 3 TIMES DAILY PRN
Status: DISCONTINUED | OUTPATIENT
Start: 2020-07-30 | End: 2020-08-01

## 2020-07-30 RX ORDER — VITAMIN B COMPLEX
1000 TABLET ORAL DAILY
Status: DISCONTINUED | OUTPATIENT
Start: 2020-07-30 | End: 2020-08-04 | Stop reason: HOSPADM

## 2020-07-30 RX ORDER — LORAZEPAM 1 MG/1
1 TABLET ORAL ONCE
Status: COMPLETED | OUTPATIENT
Start: 2020-07-30 | End: 2020-07-30

## 2020-07-30 RX ORDER — TRAZODONE HYDROCHLORIDE 50 MG/1
50 TABLET ORAL NIGHTLY PRN
Status: DISCONTINUED | OUTPATIENT
Start: 2020-07-30 | End: 2020-08-02

## 2020-07-30 RX ORDER — QUETIAPINE FUMARATE 50 MG/1
50 TABLET, FILM COATED ORAL NIGHTLY
Status: DISCONTINUED | OUTPATIENT
Start: 2020-07-30 | End: 2020-08-02

## 2020-07-30 RX ORDER — ONDANSETRON 4 MG/1
4 TABLET, FILM COATED ORAL EVERY 8 HOURS PRN
Status: DISCONTINUED | OUTPATIENT
Start: 2020-07-30 | End: 2020-08-04 | Stop reason: HOSPADM

## 2020-07-30 RX ORDER — NORGESTIMATE AND ETHINYL ESTRADIOL 7DAYSX3 28
1 KIT ORAL DAILY
Status: DISCONTINUED | OUTPATIENT
Start: 2020-07-30 | End: 2020-08-01

## 2020-07-30 RX ORDER — ACETAMINOPHEN 500 MG
1000 TABLET ORAL ONCE
Status: COMPLETED | OUTPATIENT
Start: 2020-07-30 | End: 2020-07-30

## 2020-07-30 RX ADMIN — QUETIAPINE FUMARATE 50 MG: 50 TABLET ORAL at 20:56

## 2020-07-30 RX ADMIN — LORAZEPAM 1 MG: 1 TABLET ORAL at 20:56

## 2020-07-30 RX ADMIN — ACETAMINOPHEN 1000 MG: 500 TABLET, FILM COATED ORAL at 04:57

## 2020-07-30 ASSESSMENT — SLEEP AND FATIGUE QUESTIONNAIRES
DO YOU USE A SLEEP AID: NO
DIFFICULTY ARISING: NO
DO YOU HAVE DIFFICULTY SLEEPING: YES
AVERAGE NUMBER OF SLEEP HOURS: 3
SLEEP PATTERN: DIFFICULTY FALLING ASLEEP;INSOMNIA;RESTLESSNESS
RESTFUL SLEEP: YES
DIFFICULTY FALLING ASLEEP: YES
DIFFICULTY STAYING ASLEEP: YES

## 2020-07-30 ASSESSMENT — PAIN DESCRIPTION - FREQUENCY: FREQUENCY: CONTINUOUS

## 2020-07-30 ASSESSMENT — PAIN SCALES - GENERAL
PAINLEVEL_OUTOF10: 6
PAINLEVEL_OUTOF10: 5
PAINLEVEL_OUTOF10: 3

## 2020-07-30 ASSESSMENT — PAIN DESCRIPTION - LOCATION: LOCATION: NECK

## 2020-07-30 ASSESSMENT — PATIENT HEALTH QUESTIONNAIRE - PHQ9: SUM OF ALL RESPONSES TO PHQ QUESTIONS 1-9: 26

## 2020-07-30 ASSESSMENT — PAIN - FUNCTIONAL ASSESSMENT: PAIN_FUNCTIONAL_ASSESSMENT: 0-10

## 2020-07-30 ASSESSMENT — PAIN DESCRIPTION - DESCRIPTORS: DESCRIPTORS: ACHING

## 2020-07-30 ASSESSMENT — PAIN DESCRIPTION - PAIN TYPE: TYPE: ACUTE PAIN

## 2020-07-30 ASSESSMENT — LIFESTYLE VARIABLES: HISTORY_ALCOHOL_USE: YES

## 2020-07-30 NOTE — GROUP NOTE
Group Therapy Note    Date: 7/30/2020    Group Start Time: 1000  Group End Time: 6897  Group Topic: Psychotherapy    GOLD Tuttle        Group Therapy Note    Attendees: 7/16         Patient refused to attend psychotherapy group after encouragement from staff. 1:1 talk time offered but refused. Signature:   Patricia Tuttle
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
## 2020-07-30 NOTE — GROUP NOTE
Group Therapy Note    Date: 7/30/2020    Group Start Time: 1100  Group End Time: 5250  Group Topic: Cognitive Skills    GOLD Ferguson, CTRS        Group Therapy Note    Attendees: 8/16         Pt did not participate in Cognitive Skills Group at 1100AM when encouraged by RT due to resting in room. Pt was offered talk time as an alternative to group but declined.          Discipline Responsible: Psychoeducational Specialist        Signature:  Quin Pineda
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
## 2020-07-30 NOTE — GROUP NOTE
Group Therapy Note    Date: 7/30/2020    Group Start Time: 0900  Group End Time: 0920  Group Topic: Community Meeting    CATHY Vanegas    Pt did not attend 0900 goal setting group d/t resting in room despite staff invitation to attend. 1:1 talk time offered as alternative to group session, pt declined.             Signature:  Kathya Harris

## 2020-07-31 LAB
ALBUMIN SERPL-MCNC: 3.7 G/DL (ref 3.5–5.2)
ALBUMIN/GLOBULIN RATIO: NORMAL (ref 1–2.5)
ALP BLD-CCNC: 74 U/L (ref 35–104)
ALT SERPL-CCNC: 17 U/L (ref 5–33)
ANION GAP SERPL CALCULATED.3IONS-SCNC: 9 MMOL/L (ref 9–17)
AST SERPL-CCNC: 13 U/L
BILIRUB SERPL-MCNC: 0.64 MG/DL (ref 0.3–1.2)
BUN BLDV-MCNC: 9 MG/DL (ref 6–20)
BUN/CREAT BLD: NORMAL (ref 9–20)
CALCIUM SERPL-MCNC: 9.1 MG/DL (ref 8.6–10.4)
CHLORIDE BLD-SCNC: 104 MMOL/L (ref 98–107)
CO2: 26 MMOL/L (ref 20–31)
CREAT SERPL-MCNC: 0.79 MG/DL (ref 0.5–0.9)
GFR AFRICAN AMERICAN: >60 ML/MIN
GFR NON-AFRICAN AMERICAN: >60 ML/MIN
GFR SERPL CREATININE-BSD FRML MDRD: NORMAL ML/MIN/{1.73_M2}
GFR SERPL CREATININE-BSD FRML MDRD: NORMAL ML/MIN/{1.73_M2}
GLUCOSE BLD-MCNC: 99 MG/DL (ref 70–99)
POTASSIUM SERPL-SCNC: 4.2 MMOL/L (ref 3.7–5.3)
SODIUM BLD-SCNC: 139 MMOL/L (ref 135–144)
TOTAL PROTEIN: 6.8 G/DL (ref 6.4–8.3)
TSH SERPL DL<=0.05 MIU/L-ACNC: 0.71 MIU/L (ref 0.3–5)

## 2020-07-31 PROCEDURE — 6370000000 HC RX 637 (ALT 250 FOR IP): Performed by: PSYCHIATRY & NEUROLOGY

## 2020-07-31 PROCEDURE — 80053 COMPREHEN METABOLIC PANEL: CPT

## 2020-07-31 PROCEDURE — 99232 SBSQ HOSP IP/OBS MODERATE 35: CPT | Performed by: PSYCHIATRY & NEUROLOGY

## 2020-07-31 PROCEDURE — 84443 ASSAY THYROID STIM HORMONE: CPT

## 2020-07-31 PROCEDURE — 36415 COLL VENOUS BLD VENIPUNCTURE: CPT

## 2020-07-31 PROCEDURE — 1240000000 HC EMOTIONAL WELLNESS R&B

## 2020-07-31 RX ADMIN — QUETIAPINE FUMARATE 50 MG: 50 TABLET ORAL at 21:13

## 2020-07-31 RX ADMIN — Medication 1000 UNITS: at 09:07

## 2020-07-31 RX ADMIN — Medication 1000 UNITS: at 09:02

## 2020-07-31 ASSESSMENT — PAIN - FUNCTIONAL ASSESSMENT: PAIN_FUNCTIONAL_ASSESSMENT: 0-10

## 2020-07-31 NOTE — BH NOTE
Pt declined 1600 wellness group. Pt. Met 1:1 with staff and was accepting of handout offered about Daily Affirmations.

## 2020-07-31 NOTE — GROUP NOTE
Group Therapy Note    Date: 7/31/2020    Group Start Time: 1300  Group End Time: 1430  Group Topic: Cognitive Skills    GOLD Neves, CTRS        Group Therapy Note    Attendees: 6/12         Pt did not participate in Cognitive Skills Group at 1300 when encouraged by RT due to resting in room. Pt was offered talk time as an alternative to group but declined.          Discipline Responsible: Psychoeducational Specialist        Signature:  Bryce Lang

## 2020-07-31 NOTE — PLAN OF CARE
[facility]  Day 3 Interdisciplinary Treatment Plan NOTE    Review Date & Time: 7/31/2020                      1430    Admission Type:   Admission Type: Involuntary    Reason for admission:  Reason for Admission: SI no plan  Estimated Length of Stay: 5-7 days  Estimated Discharge Date Update: to be determined by physician    PATIENT STRENGTHS:  Patient Strengths Strengths: Positive Support, No significant Physical Illness  Patient Strengths and Limitations:Limitations: Tendency to isolate self, Lacks leisure interests, Difficulty problem solving/relies on others to help solve problems, Hopeless about future, Multiple barriers to leisure interests, Inappropriate/potentially harmful leisure interests (depression substance abuse anxiety poor coping skills)  Addictive Behavior:Addictive Behavior  In the past 3 months, have you felt or has someone told you that you have a problem with:  : None  Do you have a history of Chemical Use?: No  Do you have a history of Alcohol Use?: No  Do you have a history of Street Drug Abuse?: Yes  Histroy of Prescripton Drug Abuse?: No  Medical Problems:No past medical history on file.     Risk:  Fall RiskTotal: 53  Roland Scale Roland Scale Score: 22  BVC Total: 0  Change in scores no Changes to plan of Care no    Status EXAM:   Status and Exam  Normal: No  Facial Expression: Elevated  Affect: Inappropriate  Level of Consciousness: Alert  Mood:Normal: No  Mood: Depressed, Anxious  Motor Activity:Normal: No  Motor Activity: Decreased  Interview Behavior: Cooperative  Preception: [place] to Person, Elzie Holding to Time, [place] to Place, [place] to Situation  Attention:Normal: Yes  Attention: Distractible  Thought Processes: Circumstantial  Thought Content:Normal: Yes  Thought Content: Preoccupations  Hallucinations: None  Delusions: No  Memory:Normal: Yes  Memory: Poor Recent, Confabulation  Insight and Judgment: No  Insight and Judgment: Unmotivated  Present Suicidal Ideation: No  Present Homicidal Ideation: No    Daily Assessment Last Entry:   Daily Sleep (WDL): Within Defined Limits         Patient Currently in Pain: No  Daily Nutrition (WDL): Within Defined Limits    Patient Monitoring:  Frequency of Checks: 4 times per hour, close    Psychiatric Symptoms:   Depression Symptoms  Depression Symptoms: Isolative, Loss of interest  Anxiety Symptoms  Anxiety Symptoms: Generalized  Dorothy Symptoms  Dorothy Symptoms: No problems reported or observed. Psychosis Symptoms  Delusion Type: No problems reported or observed.     Suicide Risk CSSR-S:  Have you wished you were dead or wished you could go to sleep and not wake up? : NO  Have you actually had any thoughts of killing yourself? : NO  Have you ever done anything, started to do anything, or prepared to do anything to end your life?: NO  Change in Result                NO                  Change in Plan of care            NO      EDUCATION:   EDUCATION:   Learner Progress Toward Treatment Goals: Reviewed results and recommendations of this team, Reviewed group plan and strategies, Reviewed signs, symptoms and risk of self harm and violent behavior, Reviewed goals and plan of care    Method:small group, individual verbal education    Outcome:verbalized by patient, but needs reinforcement to obtain goals    PATIENT GOALS:  Short term: PT WAS SLEEPING AND DID NOT [address], PT DID NOT DEVELOP A [address] term:PT DID NOT DEVELOP A LONG TERM GOAL    PLAN/TREATMENT RECOMMENDATIONS UPDATE: continue with group therapies, increased socialization, continue planning for after discharge goals, continue with medication compliance    SHORT-TERM GOALS UPDATE:   Time frame for Short-Term Goals: 5-7 days    LONG-TERM GOALS UPDATE:   Time frame for Long-Term Goals: 6 months  Members Present in Team Meeting: See Signature Sheet    Chuck Mack

## 2020-07-31 NOTE — H&P
HISTORY and [facility] [id number]       NAME:  Kaushik Cornejo  MRN: [id number]   YOB: 1980   Date: 7/31/2020   Age: 44 y.o. Gender: female     COMPLAINT AND PRESENT HISTORY:      Kaushik Cornejo is 44 y.o.,  female, admitted via ED because of major depression. Per chart review, Pt presented to ED for mental health evaluation with suicidal ideation with no specific plan in place. Patient awake, alert, fully oriented, calm and cooperative with assessment. Patient tearful. Patient denies any current suicidal ideation. Reports her and her  have been having relationship issues for the past few months. She reports prior to admission she reports she made a comment regarding not wanting to be alive anymore. She reports she did not mean this but what she meant was she did not want to feel this sadness anymore. Reports she does not have anybody to talk to and her  refuses to talk to her. This makes her depressed. Pt denies any homicidal ideation. Pt feels hopeless, helpless, worthless, and a general loss of energy. Pt has poor sleep, loss of appetite. Denies current auditory, visual or tactile hallucinations. Reports occasional alcohol use. Denies any illicit drug use. Reports she currently lives with her . History of head injury in 2016 where she was found to have an aneurism with subsequent clip and plate placement. Pt reports since this head injury she has had chronic headaches. Patient gets every 3-month nerve blocks as well as Botox injections for treatment of headaches with good relief of her symptoms. Patient reports chronic neck and back pain. Takes Tylenol with moderate relief of pain. Patient reports productive cough with yellowish mucus in moderate amounts for roughly 2 days prior to admission. Denies any shortness of breath or fever. Reports cough has since resolved. Internal med consulted per psychiatry.   Denies any other somatic complaints including chest pain/pressure, palpitations, shortness of breath, abdominal pain, urinary symptoms.     DIAGNOSTIC RESULTS   Labs:      Recent Labs     20  0631      K 4.2      CO2 26   BUN 9   CREATININE 0.79   GLUCOSE 99     Hepatic:   Recent Labs     20  0631   AST 13   ALT 17   BILITOT 0.64   ALKPHOS 74     U/A:  Lab Results   Component Value Date    COLORU YELLOW 2020    WBCUA 2 TO 5 10/04/2017    RBCUA 2 TO 5 10/04/2017    MUCUS NOT REPORTED 10/04/2017    BACTERIA NOT REPORTED 10/04/2017    SPECGRAV 1.004 2020    LEUKOCYTESUR NEGATIVE 2020    GLUCOSEU NEGATIVE 2020    AMORPHOUS NOT REPORTED 10/04/2017       PAST MEDICAL HISTORY     Past Medical History:   Diagnosis Date    Anxiety     Anxiety and depression     Cerebral aneurysm     Chronic left-sided low back pain     Chronic neck pain     Depression     Gallstones     Headache     PTSD (post-traumatic stress disorder)      Pt denies any history of Diabetes mellitus type 2, hypertension, stroke, heart disease, COPD, Asthma, GERD, HLD, Cancer, Seizures,Thyroid disease, Kidney Disease, Hepatitis, TB.    SURGICAL HISTORY       Past Surgical History:   Procedure Laterality Date    BRAIN ANEURYSM SURGERY      BRAIN SURGERY  2016    mizo titanium aneurysm clip safe 3T per documentation, done [facility] 2016     SECTION      x4    DILATION AND CURETTAGE OF UTERUS         FAMILY HISTORY       Family History   Problem Relation Age of Onset    Cancer Father     Stroke Father        SOCIAL HISTORY       Social History     Socioeconomic History    Marital status:      Spouse name: None    Number of children: 5    Years of education: some college    Highest education level: None   Occupational History    None   Social Needs    Financial resource strain: None    Food insecurity     Worry: None     Inability: None    Transportation needs     Medical: None Non-medical: None   Tobacco Use    Smoking status: Former Smoker     Types: Cigarettes     Last attempt to quit: 2015     Years since quittin.2    Smokeless tobacco: Never Used   Substance and Sexual Activity    Alcohol use: Yes     Comment: social    Drug use: No    Sexual activity: Yes     Partners: Male   Lifestyle    Physical activity     Days per week: None     Minutes per session: None    Stress: None   Relationships    Social connections     Talks on phone: None     Gets together: None     Attends Episcopalian service: None     Active member of club or organization: None     Attends meetings of clubs or organizations: None     Relationship status: None    Intimate partner violence     Fear of current or ex partner: None     Emotionally abused: None     Physically abused: None     Forced sexual activity: None   Other Topics Concern    None   Social History Narrative    None        REVIEW OF SYSTEMS      Allergies   Allergen Reactions    Doxycycline     Hydroxyzine Hcl     Prednisone     Penicillins Rash     Happened when she was a child, has not received since       No current facility-administered medications on file prior to encounter. Current Outpatient Medications on File Prior to Encounter   Medication Sig Dispense Refill    ondansetron (ZOFRAN) 4 MG tablet Take 1 tablet by mouth every 8 hours as needed for Nausea 20 tablet 0    busPIRone (BUSPAR) 10 MG tablet Take 10 mg by mouth 3 times daily as needed      Norgestim-Eth Estrad Triphasic (ORTHO TRI-CYCLEN, 28,) 0.18/0.215/0.25 MG-35 MCG TABS Take 1 tablet by mouth daily      metFORMIN (GLUCOPHAGE) 500 MG tablet Take 500 mg by mouth 2 times daily (with meals)      vitamin D (CHOLECALCIFEROL) 1000 UNIT TABS tablet Take 1,000 Units by mouth daily                     General health:  Fairly good. No fever or chills. Skin:  No itching, redness or rash.       Head, eyes, ears, nose, throat:  No headache, epistaxis, rhinorrhea, hearing loss or sore throat. Neck:  No pain, stiffness or masses. Cardiovascular/Respiratory system:  No chest pain, palpitation, shortness of breath, coughing or expectoration. Gastrointestinal tract: No abdominal pain, nausea, vomiting, dysphagia, diarrhea or constipation. Genitourinary:  No burning on micturition. No hesitancy, urgency, frequency or discoloration of urine. Locomotor:  No bone or joint pains. No swelling or deformities. Neuropsychiatric:  See HPI. GENERAL PHYSICAL EXAM:     Vitals: /64   Pulse 73   Temp 97.9 °F (36.6 °C) (Oral)   Resp 14   Ht 5' 4\" (1.626 m)   Wt 173 lb (78.5 kg)   LMP 07/23/2020   SpO2 99%   Breastfeeding No   BMI 29.70 kg/m²  Body mass index is 29.7 kg/m². Pt was examined with a nurse present in the room. GENERAL APPEARANCE:  Sonja Benitez is 44 y.o.,  female, not obese, nourished, conscious, alert. Does not appear to be in any distress or pain at this time. SKIN:  Warm, dry, no cyanosis or jaundice. HEAD:  Normocephalic, atraumatic. EYES:  Pupils equal, reactive to light, Conjunctiva is clear, EOMs intact melany. eyelids WNL. EARS:  No discharge, no marked hearing loss. NOSE:  No rhinorrhea, epistaxis or septal deformity. THROAT:  Not congested. No ulceration bleeding or discharge. NECK:  No stiffness, trachea central.  No palpable masses or L.N.      CHEST:  Symmetrical and equal on expansion. HEART:  Regular rate and rhythm. S1 > S2, No audible murmurs or gallops. LUNGS:  Equal on expansion, normal breath sounds. No wheezing, rhonchi or rales. ABDOMEN:  NABS x 4 quads. Soft on palpation. No localized tenderness. No guarding or rigidity. LYMPHATICS:  No palpable cervical lymphadenopathy. LOCOMOTOR, BACK AND SPINE:  No tenderness or deformities. EXTREMITIES:  Symmetrical, no pretibial edema. No calf tenderness.  No discoloration or ulcerations. NEUROLOGIC:  The patient is conscious, alert, oriented, tearful,Cranial nerve II-XII intact, taste and smell were not examined. No apparent focal sensory or motor deficits. Muscle strength equal Demarcus. No facial droop, tongue protrudes centrally, no slurring of the speech. PROVISIONAL DIAGNOSES:      Active Problems:    Major depression, chronic    Depression with suicidal ideation    PTSD (post-traumatic stress disorder)  Resolved Problems:    * No resolved hospital problems.  LISA Palafox - CNP on 7/31/2020 at 11:05 AM

## 2020-07-31 NOTE — GROUP NOTE
Group Therapy Note    Date: 7/31/2020    Group Start Time: 0900  Group End Time: 0632  Group Topic: Community Meeting    [facility] MADDI [place], [place]        Group Therapy Note    Attendees: 5/15       Pt did not participate in Community Meeting/Goals Group at 900am when encouraged by RT due to resting in room. Pt was offered talk time as an alternative to group but declined.         Discipline Responsible: Psychoeducational Specialist        Signature:  Modesta Epley

## 2020-07-31 NOTE — GROUP NOTE
Group Therapy Note    Date: 7/31/2020    Group Start Time: 1100  Group End Time: 8839  Group Topic: Cognitive Skills    GOLD [place], CTRS        Group Therapy Note    Attendees: 7/14         Pt did not participate in Cognitive Skills Group at 1100AM when encouraged by RT due to resting in room. Pt was offered talk time as an alternative to group but declined.          Discipline Responsible: Psychoeducational Specialist        Signature:  Modesta Epley

## 2020-08-01 LAB
EKG ATRIAL RATE: 79 BPM
EKG P AXIS: 36 DEGREES
EKG P-R INTERVAL: 150 MS
EKG Q-T INTERVAL: 396 MS
EKG QRS DURATION: 74 MS
EKG QTC CALCULATION (BAZETT): 454 MS
EKG R AXIS: 37 DEGREES
EKG T AXIS: 21 DEGREES
EKG VENTRICULAR RATE: 79 BPM

## 2020-08-01 PROCEDURE — 93010 ELECTROCARDIOGRAM REPORT: CPT | Performed by: INTERNAL MEDICINE

## 2020-08-01 PROCEDURE — 6370000000 HC RX 637 (ALT 250 FOR IP): Performed by: PSYCHIATRY & NEUROLOGY

## 2020-08-01 PROCEDURE — 6370000000 HC RX 637 (ALT 250 FOR IP): Performed by: NURSE PRACTITIONER

## 2020-08-01 PROCEDURE — 1240000000 HC EMOTIONAL WELLNESS R&B

## 2020-08-01 PROCEDURE — 99232 SBSQ HOSP IP/OBS MODERATE 35: CPT | Performed by: NURSE PRACTITIONER

## 2020-08-01 RX ORDER — ACETAMINOPHEN 325 MG/1
650 TABLET ORAL EVERY 4 HOURS PRN
Status: DISCONTINUED | OUTPATIENT
Start: 2020-08-01 | End: 2020-08-04 | Stop reason: HOSPADM

## 2020-08-01 RX ORDER — GUAIFENESIN 600 MG/1
600 TABLET, EXTENDED RELEASE ORAL 2 TIMES DAILY
Status: COMPLETED | OUTPATIENT
Start: 2020-08-01 | End: 2020-08-04

## 2020-08-01 RX ADMIN — ACETAMINOPHEN 650 MG: 325 TABLET, FILM COATED ORAL at 12:33

## 2020-08-01 RX ADMIN — TRAZODONE HYDROCHLORIDE 50 MG: 50 TABLET ORAL at 21:33

## 2020-08-01 RX ADMIN — QUETIAPINE FUMARATE 50 MG: 50 TABLET ORAL at 21:33

## 2020-08-01 RX ADMIN — ACETAMINOPHEN 650 MG: 325 TABLET, FILM COATED ORAL at 21:48

## 2020-08-01 RX ADMIN — ONDANSETRON HYDROCHLORIDE 4 MG: 4 TABLET, FILM COATED ORAL at 23:01

## 2020-08-01 RX ADMIN — Medication 1000 UNITS: at 08:22

## 2020-08-01 RX ADMIN — GUAIFENESIN 600 MG: 600 TABLET, EXTENDED RELEASE ORAL at 23:01

## 2020-08-01 ASSESSMENT — ENCOUNTER SYMPTOMS
NAUSEA: 0
SORE THROAT: 0
DIARRHEA: 0
ABDOMINAL PAIN: 0
EYE REDNESS: 0
VOMITING: 0
SHORTNESS OF BREATH: 0
COUGH: 0

## 2020-08-01 ASSESSMENT — PAIN SCALES - GENERAL
PAINLEVEL_OUTOF10: 4
PAINLEVEL_OUTOF10: 0
PAINLEVEL_OUTOF10: 5

## 2020-08-01 ASSESSMENT — PAIN - FUNCTIONAL ASSESSMENT
PAIN_FUNCTIONAL_ASSESSMENT: 0-10
PAIN_FUNCTIONAL_ASSESSMENT: 0-10

## 2020-08-01 ASSESSMENT — PAIN DESCRIPTION - LOCATION: LOCATION: SHOULDER

## 2020-08-01 ASSESSMENT — PAIN DESCRIPTION - PAIN TYPE: TYPE: CHRONIC PAIN

## 2020-08-01 ASSESSMENT — PAIN DESCRIPTION - ORIENTATION: ORIENTATION: RIGHT
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
## 2020-08-01 NOTE — PLAN OF CARE
Problem: Pain:  Goal: Pain level will decrease  Description: Pain level will decrease  7/31/2020 2141 by Mandie Lan RN  Outcome: Ongoing  Note: Patient denies pain to writer. Problem: Altered Mood, Depressive Behavior:  Goal: Able to verbalize acceptance of life and situations over which he or she has no control  Description: Able to verbalize acceptance of life and situations over which he or she has no control  7/31/2020 2141 by Mandie Lan RN  Outcome: Ongoing  Note: Patient is able to verbalize acceptance over life situations she has no control over. Patient is compliant with rules. She is seen out this evening for needs. She remains aloof of peers. She is flat but brightens slightly upon approach. Will continue to monitor patient for safety and behavior. Problem: Altered Mood, Depressive Behavior:  Goal: Able to verbalize and/or display a decrease in depressive symptoms  Description: Able to verbalize and/or display a decrease in depressive symptoms  7/31/2020 2141 by Mandie Lan RN  Outcome: Ongoing  Note: Patient does continue to report depression. She states she feels slightly better than yesterday. She does report the medication given to her last night did help her get a good night's rest. She is isolative to her room this evening and only comes out for needs. She is compliant with all scheduled hs medications and remains in behavioral control. Will continue to monitor patient for safety and behavior. Problem: Altered Mood, Depressive Behavior:  Goal: Able to verbalize support systems  Description: Able to verbalize support systems  7/31/2020 2141 by Mandie Lan RN  Outcome: Ongoing  Note: Patient is able to verbalize support systems of friends and her sister. She is seen talking on the phone with both of them this evening. Emotional support and reassurance given.

## 2020-08-01 NOTE — GROUP NOTE
Group Therapy Note    Date: 8/1/2020    Group Start Time: 0900  Group End Time: 4289  Group Topic: Community Meeting    Morro Nathan Plasadelaida        Group Therapy Note    Attendees 4/12         Patient's Goal:  To improve goal setting skills     Notes:   Pt was pleasant and participated well     Status After Intervention:  Improved    Participation Level:  Active Listener and Interactive    Participation Quality: Appropriate, Attentive and Sharing      Speech:  normal      Thought Process/Content: Logical      Affective Functioning: Flat      Mood: depressed      Level of consciousness:  Alert      Response to Learning: Able to verbalize current knowledge/experience and Progressing to goal      Endings: None Reported    Modes of Intervention: Education, Support and Problem-solving      Discipline Responsible: Psychoeducational Specialist      Signature:  Lilia Fontenot

## 2020-08-01 NOTE — BH NOTE
Group Therapy Note     Date: 07/31/2020     Group Start Time: 2000  Group End Time: 2100  Group Topic: Wrap-Up/Relaxation     [facility] Coyle RN           Group Therapy Note     Attendees: 3/11           Patient's Goal:  Wrap Up group     Notes:  Short and long term goals     Status After Intervention:  Improved     Participation Level:  Active Listener and Interactive     Participation Quality: Appropriate, Attentive and Sharing        Speech:  normal        Thought Process/Content: Logical        Affective Functioning: Congruent        Mood: within normal limits        Level of consciousness:  Alert, Oriented x4 and Attentive        Response to Learning: Able to verbalize current knowledge/experience, Able to verbalize/acknowledge new learning, Able to retain information and Progressing to goal        Endings: None Reported     Modes of Intervention: Education, Support and Socialization        Discipline Responsible: RN        Signature:  Yari Coyle
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
## 2020-08-02 PROCEDURE — 6370000000 HC RX 637 (ALT 250 FOR IP): Performed by: PSYCHIATRY & NEUROLOGY

## 2020-08-02 PROCEDURE — 99232 SBSQ HOSP IP/OBS MODERATE 35: CPT | Performed by: NURSE PRACTITIONER

## 2020-08-02 PROCEDURE — 1240000000 HC EMOTIONAL WELLNESS R&B

## 2020-08-02 PROCEDURE — 6370000000 HC RX 637 (ALT 250 FOR IP): Performed by: NURSE PRACTITIONER

## 2020-08-02 RX ORDER — QUETIAPINE FUMARATE 100 MG/1
100 TABLET, FILM COATED ORAL NIGHTLY
Status: DISCONTINUED | OUTPATIENT
Start: 2020-08-02 | End: 2020-08-03

## 2020-08-02 RX ADMIN — GUAIFENESIN 600 MG: 600 TABLET, EXTENDED RELEASE ORAL at 20:53

## 2020-08-02 RX ADMIN — QUETIAPINE FUMARATE 100 MG: 100 TABLET ORAL at 20:53

## 2020-08-02 RX ADMIN — Medication 1000 UNITS: at 08:27

## 2020-08-02 RX ADMIN — GUAIFENESIN 600 MG: 600 TABLET, EXTENDED RELEASE ORAL at 08:27

## 2020-08-02 NOTE — VIRTUAL HEALTH
(from the past 72 hour(s))   EKG 12 lead    Collection Time: 07/30/20  5:35 PM   Result Value Ref Range    Ventricular Rate 79 BPM    Atrial Rate 79 BPM    P-R Interval 150 ms    QRS Duration 74 ms    Q-T Interval 396 ms    QTc Calculation (Bazett) 454 ms    P Axis 36 degrees    R Axis 37 degrees    T Axis 21 degrees   TSH without Reflex    Collection Time: 07/31/20  6:31 AM   Result Value Ref Range    TSH 0.71 0.30 - 5.00 mIU/L   Comprehensive Metabolic Panel w/ Reflex to MG    Collection Time: 07/31/20  6:31 AM   Result Value Ref Range    Glucose 99 70 - 99 mg/dL    BUN 9 6 - 20 mg/dL    CREATININE 0.79 0.50 - 0.90 mg/dL    Bun/Cre Ratio NOT REPORTED 9 - 20    Calcium 9.1 8.6 - 10.4 mg/dL    Sodium 139 135 - 144 mmol/L    Potassium 4.2 3.7 - 5.3 mmol/L    Chloride 104 98 - 107 mmol/L    CO2 26 20 - 31 mmol/L    Anion Gap 9 9 - 17 mmol/L    Alkaline Phosphatase 74 35 - 104 U/L    ALT 17 5 - 33 U/L    AST 13 <32 U/L    Total Bilirubin 0.64 0.3 - 1.2 mg/dL    Total Protein 6.8 6.4 - 8.3 g/dL    Alb 3.7 3.5 - 5.2 g/dL    Albumin/Globulin Ratio NOT REPORTED 1.0 - 2.5    GFR Non-African American >60 >60 mL/min    GFR African American >60 >60 mL/min    GFR Comment          GFR Staging NOT REPORTED        Medications  Current Facility-Administered Medications   Medication Dose Route Frequency Provider Last Rate Last Dose    acetaminophen (TYLENOL) tablet 650 mg  650 mg Oral Q4H PRN Angelina Declinton Juan Daniel, APRN - CNP   650 mg at 08/01/20 2148    guaiFENesin (MUCINEX) extended release tablet 600 mg  600 mg Oral BID [facility] Shawn, APRN - CNP   600 mg at 08/02/20 0827    traZODone (DESYREL) tablet 50 mg  50 mg Oral Nightly PRN Cathy Andrea MD   50 mg at 08/01/20 2133    QUEtiapine (SEROQUEL) tablet 50 mg  50 mg Oral Nightly Ezekiel Hernandez MD   50 mg at 08/01/20 2133    metFORMIN (GLUCOPHAGE) tablet 500 mg  500 mg Oral BID PEPE Hernandez MD        ondansetron (ZOFRAN) tablet 4 mg  4 mg Oral Q8H PRN Maretta Aid Destiny Parks MD   4 mg at 08/01/20 2301    vitamin D (CHOLECALCIFEROL) tablet 1,000 Units  1,000 Units Oral Daily Ezekiel Hernandez MD   1,000 Units at 08/02/20 0827    haloperidol lactate (HALDOL) injection 5 mg  5 mg Intramuscular Q4H PRN Ezekiel Hernandez MD        Or   Maddi Ordoñez haloperidol (HALDOL) tablet 5 mg  5 mg Oral Q4H PRN Ezeikel Hernandez MD             QUEtiapine  100 mg Oral Nightly    guaiFENesin  600 mg Oral BID    metFORMIN  500 mg Oral BID WC    Vitamin D  1,000 Units Oral Daily       ASSESSMENT  Depression with suicidal ideation     Patient's Response to Treatment: neutral    PLAN  · Continue inpatient psychiatric treatment  · Supportive therapy with medication management. Reviewed risks and benefits as well as potential side effects with patient. · Therapeutic activities and groups  · Follow up at [facility] after symptoms stabilized  · D/C PRN trazodone d/t dizziness   · Increase Seroquel to 100 mg QHS     Electronically signed by LISA Jenkins CNP on 8/2/2020 at 4:19 PM.    Patient Location:  [address] [address]    Provider Location (City/State):   [place], [place]    This virtual visit was conducted via interactive/real-time audio/video.

## 2020-08-02 NOTE — GROUP NOTE
Group Therapy Note    Date: 8/2/2020    Group Start Time: 1000  Group End Time: 6056  Group Topic: Psychoeducation    [facility]    KAMERON Martinez LSW        Group Therapy Note    Attendees: 7/13         Patient offered group therapy but declined. 1:1 offered.     Signature:  KAMERON Martinez LSW

## 2020-08-02 NOTE — GROUP NOTE
Group Therapy Note    Date: 8/2/2020    Group Start Time: 1330  Group End Time: 1973  Group Topic: Cognitive Skills    [facility]    ALDO BaileyS        Group Therapy Note    Attendees: 7         Patient's Goal:  To improve coping skills / improve communication    Notes:  Pt participated well     Status After Intervention:  Improved    Participation Level:  Active Listener and Interactive    Participation Quality: Appropriate and Attentive      Speech:  normal      Thought Process/Content: Logical      Affective Functioning: flat      Mood: depressed      Level of consciousness:  Alert and Oriented x4      Response to Learning: progressing to goal       Endings: None Reported    Modes of Intervention: Education, Support, Socialization and Problem-solving      Discipline Responsible: Psychoeducational Specialist      Signature:  Mitzy Henson

## 2020-08-02 NOTE — PLAN OF CARE
Problem: Pain:  Goal: Control of chronic pain  Description: Control of chronic pain  Outcome: Ongoing  Note: Patient reports pain of 6/10 in right shoulder. Denies any thoughts of self harm or hallucinations. Out for meals only. Behavior controlled.

## 2020-08-02 NOTE — GROUP NOTE
Group Therapy Note    Date: 8/2/2020    Group Start Time: 7215  Group End Time: 0920  Group Topic: Community Meeting    [facility] Edrick Denver, CTRS    Pt did not attend 0900 goal setting group d/t resting in room despite staff invitation to attend. 1:1 talk time offered as alternative to group session, pt declined.          Signature:  Jamir Andrew

## 2020-08-03 LAB
-: ABNORMAL
AMORPHOUS: ABNORMAL
BACTERIA: ABNORMAL
BILIRUBIN URINE: NEGATIVE
CASTS UA: ABNORMAL /LPF
COLOR: YELLOW
COMMENT UA: ABNORMAL
CRYSTALS, UA: ABNORMAL /HPF
EPITHELIAL CELLS UA: ABNORMAL /HPF
GLUCOSE URINE: NEGATIVE
KETONES, URINE: NEGATIVE
LEUKOCYTE ESTERASE, URINE: ABNORMAL
MUCUS: ABNORMAL
NITRITE, URINE: NEGATIVE
OTHER OBSERVATIONS UA: ABNORMAL
PH UA: 6 (ref 5–8)
PROTEIN UA: NEGATIVE
RBC UA: ABNORMAL /HPF
RENAL EPITHELIAL, UA: ABNORMAL /HPF
SPECIFIC GRAVITY UA: 1.01 (ref 1–1.03)
TRICHOMONAS: ABNORMAL
TURBIDITY: CLEAR
URINE HGB: NEGATIVE
UROBILINOGEN, URINE: NORMAL
WBC UA: ABNORMAL /HPF
YEAST: ABNORMAL

## 2020-08-03 PROCEDURE — 99232 SBSQ HOSP IP/OBS MODERATE 35: CPT | Performed by: PSYCHIATRY & NEUROLOGY

## 2020-08-03 PROCEDURE — 1240000000 HC EMOTIONAL WELLNESS R&B

## 2020-08-03 PROCEDURE — 6370000000 HC RX 637 (ALT 250 FOR IP): Performed by: NURSE PRACTITIONER

## 2020-08-03 PROCEDURE — 6370000000 HC RX 637 (ALT 250 FOR IP): Performed by: PSYCHIATRY & NEUROLOGY

## 2020-08-03 PROCEDURE — 81001 URINALYSIS AUTO W/SCOPE: CPT

## 2020-08-03 RX ORDER — QUETIAPINE FUMARATE 100 MG/1
100 TABLET, FILM COATED ORAL NIGHTLY
Status: DISCONTINUED | OUTPATIENT
Start: 2020-08-03 | End: 2020-08-03

## 2020-08-03 RX ADMIN — GUAIFENESIN 600 MG: 600 TABLET, EXTENDED RELEASE ORAL at 20:32

## 2020-08-03 RX ADMIN — Medication 1000 UNITS: at 09:42

## 2020-08-03 RX ADMIN — QUETIAPINE FUMARATE 150 MG: 100 TABLET ORAL at 20:32

## 2020-08-03 RX ADMIN — ACETAMINOPHEN 650 MG: 325 TABLET, FILM COATED ORAL at 09:44

## 2020-08-03 RX ADMIN — GUAIFENESIN 600 MG: 600 TABLET, EXTENDED RELEASE ORAL at 09:42

## 2020-08-03 ASSESSMENT — PAIN DESCRIPTION - PAIN TYPE
TYPE: CHRONIC PAIN
TYPE: CHRONIC PAIN

## 2020-08-03 ASSESSMENT — PAIN SCALES - GENERAL
PAINLEVEL_OUTOF10: 3
PAINLEVEL_OUTOF10: 1

## 2020-08-03 ASSESSMENT — PAIN DESCRIPTION - LOCATION
LOCATION: SHOULDER
LOCATION: SHOULDER

## 2020-08-03 NOTE — GROUP NOTE
Group Therapy Note    Date: 8/3/2020    Group Start Time: 0900  Group End Time: 9284  Group Topic: Community Meeting    [facility]    [place], [place]        Group Therapy Note    Attendees: 7/16              Patient's Goal:  To increase social interaction and to explore and identify short term goals r/t wellness and recovery. RT discussed group schedule and unit structure/resources and encouraged pts to be engaged in their treatment. Pts were given opportunities to ask questions. Notes: Pt participated in group task and was able to identify short term goals r/t wellness and recovery. Pt is pleasant and supportive of peers. Pt c/o poor sleep        Status After Intervention:  Improved     Participation Level:  Active Listener and Interactive     Participation Quality: Appropriate, Attentive, Sharing         Speech:   Normal     Thought Process/Content: Logical,linear     Affective Functioning: Blunted     Mood: dysthymic        Level of consciousness:  Alert, and Attentive        Response to Learning: Able to verbalize current knowledge/experience, Able to verbalize/acknowledge new learning, and Progressing to goal        Endings: None Reported     Modes of Intervention: Education, Support, Socialization, Exploration, Clarifying and Problem-solving        Discipline Responsible: Psychoeducational Specialist        Signature:  Quin Pineda

## 2020-08-03 NOTE — PLAN OF CARE
Problem: Altered Mood, Depressive Behavior:  Goal: Able to verbalize and/or display a decrease in depressive symptoms  Description: Able to verbalize and/or display a decrease in depressive symptoms  Outcome: Ongoing     Problem: Altered Mood, Depressive Behavior:  Goal: Able to verbalize support systems  Description: Able to verbalize support systems  Outcome: Ongoing   Patient denies suicidal ideas at this time. Patient denies homicidal ideas at this time. Patient denies depressive symptoms at this time. Patient has been out on the phone talking to her support system. Patient is free of self harm at this time. Patient agrees to seek out staff if thoughts to harm self arise. Staff will provide support and reassurance as needed. Safety checks maintained every 15 minutes.

## 2020-08-03 NOTE — BH NOTE
1:1 interview was done via Telehealth by Dr Addison Bhat. States she has trouble sleeping, waking several times during the night & then starts to worry. Discussed medications. States her mood is better during the day, but at night she worries more than prior to admission. Discussed concerns of a bladder infection. Internal Med. Consult will be placed.

## 2020-08-03 NOTE — BH NOTE
Patient refused her metformin she denies and exhibits no symptoms of hyper/hypoglycemic issues. Patient verbalized bilateral shoulder discomfort, PRN tylenol administered and helpful.

## 2020-08-03 NOTE — GROUP NOTE
Group Therapy Note    Date: 8/3/2020    Group Start Time: 1100  Group End Time: 9036  Group Topic: Cognitive Skills    [facility] KIMBER Quintanae Child, CTRS        Group Therapy Note    Attendees: 9/16         Patient's Goal:  To increase social interaction and to practice decision making and communication skills. Notes: Pt participated fully in group task . Pt was able to practice decision making and communication skills. Status After Intervention:  Improved     Participation Level:  Active Listener and Interactive     Participation Quality: Appropriate, Attentive, Sharing and Supportive        Speech:  normal        Thought Process/Content: Logical  Linear        Affective Functioning: Congruent   Mood: euthymic        Level of consciousness:  Alert, Oriented x4 and Attentive        Response to Learning: Able to verbalize current knowledge/experience, Able to verbalize/acknowledge new learning, Able to retain information and Progressing to goal        Endings: None Reported     Modes of Intervention: Education, Support, Socialization, Exploration, Clarifying and Problem-solving        Discipline Responsible: Psychoeducational Specialist        Signature:  Nadya Franklin

## 2020-08-03 NOTE — PROGRESS NOTES
BEHAVIORAL HEALTH FOLLOW-UP NOTE     8/3/2020     Patient was seen and examined in person, Chart reviewed   Patient's case discussed with staff/team    Chief Complaint: suicidal ideation    Interim History:     Patient says she is crying less. Mood is a little better. She is sleeping less at night. She wakes up anxious every hour. She is thinking about things at home and and worrying if her children. Benjamin Wolf      Appetite:   [x] Normal/Unchanged  [] Increased  [] Decreased      Sleep:       [] Normal/Unchanged  [] Fair       [x] Poor              Energy:    [] Normal/Unchanged  [] Increased  [x] Decreased        Aggression:  [] yes  [x] no    Patient is [x] able  [] unable to CONTRACT FOR SAFETY ON THE UNIT    PAST MEDICAL/PSYCHIATRIC HISTORY:   Past Medical History:   Diagnosis Date    Anxiety     Anxiety and depression     Cerebral aneurysm     Chronic left-sided low back pain     Chronic neck pain     Depression     Gallstones     Headache     PTSD (post-traumatic stress disorder)        FAMILY/SOCIAL HISTORY:  Family History   Problem Relation Age of Onset    Cancer Father     Stroke Father      Social History     Socioeconomic History    Marital status:      Spouse name: Not on file    Number of children: 11    Years of education: some college    Highest education level: Not on file   Occupational History    Not on file   Social Needs    Financial resource strain: Not on file    Food insecurity     Worry: Not on file     Inability: Not on file   [employer] needs     Medical: Not on file     Non-medical: Not on file   Tobacco Use    Smoking status: Former Smoker     Types: Cigarettes     Last attempt to quit: 2015     Years since quittin.2    Smokeless tobacco: Never Used   Substance and Sexual Activity    Alcohol use: Yes     Comment: social    Drug use: No    Sexual activity: Yes     Partners: Male   Lifestyle    Physical activity     Days per week: Not on file     Minutes per session: Not on file    Stress: Not on file   Relationships    Social connections     Talks on phone: Not on file     Gets together: Not on file     Attends Anabaptist service: Not on file     Active member of club or organization: Not on file     Attends meetings of clubs or organizations: Not on file     Relationship status: Not on file    Intimate partner violence     Fear of current or ex partner: Not on file     Emotionally abused: Not on file     Physically abused: Not on file     Forced sexual activity: Not on file   Other Topics Concern    Not on file   Social History Narrative    Not on file           ROS:  [x] All negative/unchanged except if checked.  Explain positive(checked items) below:  [] Constitutional  [] Eyes  [] Ear/Nose/Mouth/Throat  [] Respiratory  [] CV  [] GI  []   [] Musculoskeletal  [] Skin/Breast  [] Neurological  [] Endocrine  [] Heme/Lymph  [] Allergic/Immunologic    Explanation:     MEDICATIONS:    Current Facility-Administered Medications:     QUEtiapine (SEROQUEL) tablet 100 mg, 100 mg, Oral, Nightly, Jaydaha Danni, APRN - CNP, 100 mg at 08/02/20 2053    acetaminophen (TYLENOL) tablet 650 mg, 650 mg, Oral, Q4H PRN, [facility], APRN - CNP, 650 mg at 08/03/20 0944    guaiFENesin (MUCINEX) extended release tablet 600 mg, 600 mg, Oral, BID, [place] Coffee, APRN - CNP, 600 mg at 08/03/20 0942    metFORMIN (GLUCOPHAGE) tablet 500 mg, 500 mg, Oral, BID , Yuliya Reynoso MD    ondansetron (ZOFRAN) tablet 4 mg, 4 mg, Oral, Q8H PRN, Yuliya Reynoso MD, 4 mg at 08/01/20 2301    vitamin D (CHOLECALCIFEROL) tablet 1,000 Units, 1,000 Units, Oral, Daily, Yuliya Reynoso MD, 1,000 Units at 08/03/20 0942    haloperidol lactate (HALDOL) injection 5 mg, 5 mg, Intramuscular, Q4H PRN **OR** haloperidol (HALDOL) tablet 5 mg, 5 mg, Oral, Q4H PRN, Yuliya Reynoso MD      Examination:  /71   Pulse 80   Temp 98 °F (36.7 °C) (Oral)   Resp 14   Ht 5' 4\" (1.626 m) Encourage patient to attend group and other milieu activities. Discharge planning discussed with the patient and treatment team.    PSYCHOTHERAPY/COUNSELING:  [] Therapeutic interview  [x] Supportive  [] CBT  [] Ongoing  [] Other    [x] Patient continues to need, on a daily basis, active treatment furnished directly by or requiring the supervision of inpatient psychiatric personnel      Anticipated Length of stay:3-5 days. Gayla Benoit is a 44 y.o. female being evaluated by a Virtual Visit (video visit) encounter to address concerns as mentioned above. A caregiver was present in the room along with the patient. Pursuant to the emergency declaration under the [address] [place] [address], [address] [place] [address] authority and the [employer] and [employer] Act, this Virtual Visit was conducted with patient's (and/or legal guardian's) consent, to reduce the patient's risk of exposure to COVID-19 and provide necessary medical care. Services were provided through a video synchronous discussion virtually to substitute for in-person visit by provider. Patient is present at [facility]  and I am physically present at my home in [place] [address], [place] on 8/3/2020 at 11:59 AM    An [address] [facility] was used to authenticate this note. **This report has been created using voice recognition software. It may contain minor errors which are inherent in voice recognition technology. **

## 2020-08-03 NOTE — FLOWSHEET NOTE
*Patient participated  In the Spirituality Group       08/03/20 4476   Encounter Summary   Services provided to: Patient   Referral/Consult From: Rounding   Continue Visiting   (8/3/20)   Complexity of Encounter Moderate   Length of Encounter 30 minutes   Spiritual/Mosque   Type Spiritual support   Assessment Calm; Approachable   Intervention Active listening   Outcome Receptive;Engaged in conversation;Expressed gratitude

## 2020-08-03 NOTE — PLAN OF CARE
Problem: Pain:  Goal: Pain level will decrease  Description: Pain level will decrease  Outcome: Ongoing  Note: Patient verbalized bilateral shoulder discomfort, PRN tylenol requested and received. Medication helpful within the hour. Problem: Pain:  Goal: Control of acute pain  Description: Control of acute pain  Outcome: Ongoing     Problem: Pain:  Goal: Control of chronic pain  Description: Control of chronic pain  Outcome: Ongoing     Problem: Altered Mood, Depressive Behavior:  Goal: Able to verbalize acceptance of life and situations over which he or she has no control  Description: Able to verbalize acceptance of life and situations over which he or she has no control  Outcome: Ongoing     Problem: Altered Mood, Depressive Behavior:  Goal: Able to verbalize and/or display a decrease in depressive symptoms  Description: Able to verbalize and/or display a decrease in depressive symptoms  8/3/2020 1131 by Juan R Moody LPN  Outcome: Ongoing  Note: Patient is calm and cooperative, she denies any suicidal or homicidal ideations but expresses depression and anxiety. Patient is having difficulty attempting to problem solve her life stress. The importance of programming and med stabilization encouraged.      Problem: Altered Mood, Depressive Behavior:  Goal: Able to verbalize support systems  Description: Able to verbalize support systems  8/3/2020 1131 by Juan R Moody LPN  Outcome: Ongoing

## 2020-08-03 NOTE — GROUP NOTE
Group Therapy Note    Date: 8/3/2020    Group Start Time: 1000  Group End Time: 1030  Group Topic: Psychotherapy    [facility]    ISAÍAS Buchanan        Group Therapy Note    Attendees:          Patient's Goal:  To actively participate in psychotherapy group and remain in the here-and-now and to actively participates in group as it relates to finding ways to fight the stigma of mental illness and ways to support, educate and advocate others on mental health issues      Notes:  Pt was pleasant and attetive    Status After Intervention:  Improved    Participation Level:  Active Listener    Participation Quality: Sharing      Speech:  normal      Thought Process/Content: Logical      Affective Functioning: Congruent      Mood: euthymic      Level of consciousness:  Alert, Oriented x4 and Attentive      Response to Learning: Able to verbalize current knowledge/experience, Able to verbalize/acknowledge new learning, Able to retain information and Progressing to goal      Endings: None Reported    Modes of Intervention: Education, Support, Socialization, Clarifying and Problem-solving      Discipline Responsible: /Counselor      Signature:  ISAÍAS Buchanan

## 2020-08-04 VITALS
TEMPERATURE: 97.7 F | OXYGEN SATURATION: 98 % | HEIGHT: 64 IN | BODY MASS INDEX: 29.53 KG/M2 | RESPIRATION RATE: 16 BRPM | DIASTOLIC BLOOD PRESSURE: 74 MMHG | SYSTOLIC BLOOD PRESSURE: 100 MMHG | WEIGHT: 173 LBS | HEART RATE: 100 BPM

## 2020-08-04 PROBLEM — Z86.79 HISTORY OF CEREBRAL ANEURYSM REPAIR: Status: ACTIVE | Noted: 2017-03-25

## 2020-08-04 PROBLEM — Z87.898 HISTORY OF SEIZURE: Status: ACTIVE | Noted: 2020-08-04

## 2020-08-04 PROCEDURE — 6370000000 HC RX 637 (ALT 250 FOR IP): Performed by: NURSE PRACTITIONER

## 2020-08-04 PROCEDURE — 99239 HOSP IP/OBS DSCHRG MGMT >30: CPT | Performed by: PSYCHIATRY & NEUROLOGY

## 2020-08-04 PROCEDURE — 6370000000 HC RX 637 (ALT 250 FOR IP): Performed by: PSYCHIATRY & NEUROLOGY

## 2020-08-04 RX ORDER — BUSPIRONE HYDROCHLORIDE 10 MG/1
10 TABLET ORAL 3 TIMES DAILY
Qty: 90 TABLET | Refills: 0 | Status: SHIPPED | OUTPATIENT
Start: 2020-08-04

## 2020-08-04 RX ORDER — ONDANSETRON 4 MG/1
4 TABLET, FILM COATED ORAL EVERY 8 HOURS PRN
Qty: 20 TABLET | Refills: 0 | Status: SHIPPED | OUTPATIENT
Start: 2020-08-04

## 2020-08-04 RX ORDER — QUETIAPINE FUMARATE 50 MG/1
150 TABLET, FILM COATED ORAL NIGHTLY
Qty: 60 TABLET | Refills: 3 | Status: SHIPPED | OUTPATIENT
Start: 2020-08-04

## 2020-08-04 RX ADMIN — Medication 1000 UNITS: at 09:18

## 2020-08-04 RX ADMIN — GUAIFENESIN 600 MG: 600 TABLET, EXTENDED RELEASE ORAL at 09:18

## 2020-08-04 NOTE — BH NOTE
[facility] [id number] [facility] [place] [facility] S[facility]:   Writer meets with client regarding discharge and safety planning information and to make sure client is aware of community resources, personalized list of people and social settings that can provide distraction and support, internal coping strategies, and warning signs that might signal a crisis. Writer also provides the client with a folder to include ideas on mental health community support systems, free activities and ideas to build new social support systems, and mental health diagnosis online and Facebook resources, and local and national help lines for another option to call if in a crisis. Client is future focused and is planning to take some time off before she returns to work and looking forward to spending time with her children and getting back into therapy with her private therapist. Client states has set short- and long-term goals and making steps while here at the hospital.  PT denies current thoughts or plans of suicide, no feelings of hopelessness, and plans to attend follow-up appointment and stay on medications.

## 2020-08-04 NOTE — BH NOTE
1:1 interview was done via Telehealth by Dr Franky Andrea. Pt states she was able to sleep better last night. States she feels she can be safe if she were discharged. Discussed discharge planning for today. Discussed medication scheduling , when it is best to take her meds.

## 2020-08-04 NOTE — GROUP NOTE
Group Therapy Note    Date: 8/4/2020    Group Start Time: 1330  Group End Time: 6016  Group Topic: Healthy Living/Wellness    CATHY Page        Group Therapy Note    Attendees: 11/19         Patient's Goal:  To improve self esteem/ wellness     Notes:  Pt was pleasant and cooperated well     Status After Intervention:  Improved    Participation Level:  Active Listener and Interactive    Participation Quality: Appropriate, Attentive, Sharing and Supportive      Speech:  normal      Thought Process/Content: Logical      Affective Functioning: Congruent      Mood: euthymic      Level of consciousness:  Alert and Oriented x4     Response to Learning: Able to verbalize current knowledge/experience and Progressing to goal      Endings: None Reported    Modes of Intervention: Education, Support, Socialization and Problem-solving      Discipline Responsible: Psychoeducational Specialist      Signature:  Franklin Noble

## 2020-08-04 NOTE — PLAN OF CARE
Problem: Altered Mood, Depressive Behavior:  Goal: Able to verbalize acceptance of life and situations over which he or she has no control  Description: Able to verbalize acceptance of life and situations over which he or she has no control  8/3/2020 2129 by Yari Coyle RN  Outcome: Ongoing  Denies SI at this time  Problem: Altered Mood, Depressive Behavior:  Goal: Able to verbalize and/or display a decrease in depressive symptoms  Description: Able to verbalize and/or display a decrease in depressive symptoms  8/3/2020 2129 by Yari Coyle RN  Outcome: Ongoing     Patient states they are not having thoughts of self harm at this time and verbally agrees to seek staff if feelings of harming self arise. Patient behavior controlled and accepting of medications,seclusive to self,flat,denies audio hallucination and visual hallucinations patient eating and sleeping well. Staff will continue to monitor for safety and offer support as needed.

## 2020-08-04 NOTE — GROUP NOTE
Group Therapy Note    Date: 8/4/2020    Group Start Time: 1000  Group End Time: 1486  Group Topic: Psychotherapy    [facility]    Aaron Archuleta        Group Therapy Note    Attendees: 11/20         Patient's Goal:  PT will demonstrate increased interpersonal interaction and a clear understanding on multiple types of coping skills relating to the here-and-now therapeutic practice. Notes:  PT was an active lsitener during group discussion on this date. Status After Intervention:  Improved    Participation Level: Active Listener and Interactive    Participation Quality: Appropriate and Attentive      Speech:  normal      Thought Process/Content: Logical      Affective Functioning: Flat      Mood: depressed      Level of consciousness:  Alert, Oriented x4 and Attentive      Response to Learning: Able to verbalize current knowledge/experience and Progressing to goal      Endings: None Reported    Modes of Intervention: Support, Socialization, Exploration, Clarifying and Problem-solving      Discipline Responsible: /Counselor      Signature:   Aaron Archuleta
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
## 2020-08-04 NOTE — GROUP NOTE
Group Therapy Note    Date: 8/4/2020    Group Start Time: 1430  Group End Time: 6178  Group Topic: Recreational    [facility] Oliveiraine Wally, CTRS        Group Therapy Note    Attendees: 12/19         Patient's Goal:  To increase social interaction and to practice leisure and communication skills. Notes: Pt participated fully in group task . Pt was able to practice leisure and communication skills. Status After Intervention:  Improved     Participation Level:  Active Listener and Interactive     Participation Quality: Appropriate, Attentive, Sharing and Supportive        Speech:  normal        Thought Process/Content: Logical  Linear        Affective Functioning: Congruent   Mood: euthymic        Level of consciousness:  Alert, Oriented x4 and Attentive        Response to Learning: Able to verbalize current knowledge/experience, Able to verbalize/acknowledge new learning, Able to retain information and Progressing to goal        Endings: None Reported     Modes of Intervention: Education, Support, Socialization, Exploration, Clarifying and Problem-solving        Discipline Responsible: Psychoeducational Specialist        Signature:  [address]

## 2020-08-04 NOTE — BH NOTE
5 [facility]  Discharge Note     Pt belongings: Retrieved from room/safe, reviewed and packed to take with. Dentures: None  Vision - Corrective Lenses: None  Hearing Aid: None  Jewelry: Ring  Body Piercings Removed: N/A  Other Valuables: Cell phone       Patient instructed on discharge instructions, pt verbalizes understanding and signs AVS. Pt in control at time of discharge and denies any suicidal or homicidal ideations. Pt ambulates to main entrance with psych staff x2. Rx available for pick at home pharmacy. No complaints voiced at this time.         Status EXAM upon discharge:  Status and Exam  Normal: No  Facial Expression: Flat, Sad  Affect: Appropriate  Level of Consciousness: Alert  Mood:Normal: No  Mood: Sad  Motor Activity:Normal: Yes  Motor Activity: Decreased  Interview Behavior: Cooperative  Preception: Cranks to Person, Ofelia Miriam to Time, Cranks to Place  Attention:Normal: No  Attention: Distractible  Thought Processes: Blocking  Thought Content:Normal: No  Thought Content: Other(See Comment)  Hallucinations: None  Delusions: No  Memory:Normal: Yes  Memory: Poor Remote  Insight and Judgment: No  Insight and Judgment: Poor Judgment, Poor Insight  Present Suicidal Ideation: No  Present Homicidal Ideation: No    Ruben Naik LPN

## 2020-08-04 NOTE — CONSULTS
[facility] Internal Medicine    CONSULTATION  / FOLLOW UP VISIT       Date:   8/4/2020  Patient name:  Anisa Dumont  Date of admission:  7/30/2020  2:10 AM  MRN:   [id number]  Account:  [de-identified]  YOB: 1980  PCP:    LISA Shay CNP  Room:   6016/6679-46  Code Status:    Full Code    Physician Requesting Consult: Honorio Ma MD    History of Present Illness:      C/C ;       REASON FOR CONSULT;  Medical comorbidity and medication management ;  Specifically for                                                   Medical comorbidity         HPI;     8/4/2020    · Patient known to have history of cerebral aneurysm repair depression seizures 1. History on admission;        Principal Problem:    Depression with suicidal ideation  Active Problems:    History of cerebral aneurysm repair    Major depression, chronic    PTSD (post-traumatic stress disorder)    History of seizure  Resolved Problems:    * No resolved hospital problems.  *         Significant last 24 hr data reviewed  [x] yes     Vitals:    08/02/20 2024 08/03/20 0840 08/03/20 1948 08/04/20 0821   BP: 114/74 110/71 108/71 100/74   Pulse: 91 80 108 100   Resp: 14 14 14 16   Temp: 98.1 °F (36.7 °C) 98 °F (36.7 °C) 98.4 °F (36.9 °C) 97.7 °F (36.5 °C)   TempSrc:  Oral Oral Oral   SpO2:       Weight:       Height:          Recent Results (from the past 24 hour(s))   Urinalysis Reflex to Culture    Collection Time: 08/03/20  6:40 PM    Specimen: Urine, clean catch   Result Value Ref Range    Color, UA YELLOW YELLOW    Turbidity UA CLEAR CLEAR    Glucose, Ur NEGATIVE NEGATIVE    Bilirubin Urine NEGATIVE NEGATIVE    Ketones, Urine NEGATIVE NEGATIVE    Specific Gravity, UA 1.010 1.000 - 1.030    Urine Hgb NEGATIVE NEGATIVE    pH, UA 6.0 5.0 - 8.0    Protein, UA NEGATIVE NEGATIVE    Urobilinogen, Urine Normal Normal    Nitrite, Urine NEGATIVE NEGATIVE    Leukocyte Esterase, Urine TRACE (A) NEGATIVE    Urinalysis Comments NOT REPORTED    Microscopic Urinalysis    Collection Time: 08/03/20  6:40 PM   Result Value Ref Range    -          WBC, UA 2 TO 5 /HPF    RBC, UA 0 TO 2 /HPF    Casts UA NOT REPORTED /LPF    Crystals, UA NOT REPORTED None /HPF    Epithelial Cells UA 2 TO 5 /HPF    Renal Epithelial, UA NOT REPORTED 0 /HPF    Bacteria, UA MODERATE (A) None    Mucus, UA NOT REPORTED None    Trichomonas, UA NOT REPORTED None    Amorphous, UA NOT REPORTED None    Other Observations UA NOT REPORTED NOT REQ. Yeast, UA NOT REPORTED None     No results for input(s): POCGLU in the last 72 hours. No results found. ---     Past and surgical history as recorded in H&P  Social History:     Tobacco:    reports that she quit smoking about 5 years ago. Her smoking use included cigarettes. She has never used smokeless tobacco.  Alcohol:      reports current alcohol use. Drug Use:  reports no history of drug use. Review of Systems:     POSITIVE AND NEGATIVES AS DESCRIBED IN HISTORY OF PRESENT ILLNESS ;  IN ADDITION ;  Review of Systems          All other systems negative                Physical Exam:     Physical Exam   Vitals:    08/02/20 2024 08/03/20 0840 08/03/20 1948 08/04/20 0821   BP: 114/74 110/71 108/71 100/74   Pulse: 91 80 108 100   Resp: 14 14 14 16   Temp: 98.1 °F (36.7 °C) 98 °F (36.7 °C) 98.4 °F (36.9 °C) 97.7 °F (36.5 °C)   TempSrc:  Oral Oral Oral   SpO2:       Weight:       Height:                       Body mass index is 29.7 kg/m². General Appearance:   -, CO-OPERATIVE ,                                                        Pulmonary/Chest:        Clear to auscultation bilaterally . No wheezes, rales or rhonchi . Cardiovascular:            Normal rate, regular rhythm,                                          No murmur or  Gallop .     Abdomen:                       Soft, non-tender Extremities:                    No Edema . Mental status as per psych notes         Data:     Labs:      URINE ANALYSIS: No results found for: LABURIN     CBC:  Lab Results   Component Value Date    WBC 6.4 07/18/2020    HGB 14.3 07/18/2020     07/18/2020        BMP:    Lab Results   Component Value Date     07/31/2020    K 4.2 07/31/2020     07/31/2020    CO2 26 07/31/2020    BUN 9 07/31/2020    CREATININE 0.79 07/31/2020    GLUCOSE 99 07/31/2020      LIVER PROFILE:  Lab Results   Component Value Date    ALT 17 07/31/2020    AST 13 07/31/2020    PROT 6.8 07/31/2020    BILITOT 0.64 07/31/2020    BILIDIR <0.08 10/04/2017    LABALBU 3.7 07/31/2020             Radiology:           Assessment :      Primary Problem  Principal Problem:    Depression with suicidal ideation  Active Problems:    History of cerebral aneurysm repair    Major depression, chronic    PTSD (post-traumatic stress disorder)    History of seizure  Resolved Problems:    * No resolved hospital problems. *              Plan:          8/4/20    · Will monitor 1. On metformin for diabetes         Lab Results   Component Value Date    CREATININE 0.79 07/31/2020     No results found for: LABA1C  No results found for: POCGLU            Medications: Allergies: Allergies   Allergen Reactions    Doxycycline     Hydroxyzine Hcl     Prednisone     Penicillins Rash     Happened when she was a child, has not received since       Current Meds:   Scheduled Meds:    QUEtiapine  150 mg Oral Nightly    guaiFENesin  600 mg Oral BID    metFORMIN  500 mg Oral BID WC    Vitamin D  1,000 Units Oral Daily     Continuous Infusions:   PRN Meds: acetaminophen, ondansetron, haloperidol lactate **OR** haloperidol      Thanks for consulting us . Will monitorvitals and clinical course , and  Optimize therapy  as needed .        July Brink MD    Copy sent to  Lisy Breen, APRN - CNP    Pleasenote that this chart was generated using voice recognition Dragon dictation software. Although every effort was made to ensure the accuracy of this automated transcription, some errors in transcription may have occurred.

## 2020-08-04 NOTE — PLAN OF CARE
Problem: Pain:  Goal: Pain level will decrease  Description: Pain level will decrease  Outcome: Ongoing  Note: Patient has some general discomfort, warm showers and PRN meds helpful. Problem: Pain:  Goal: Control of acute pain  Description: Control of acute pain  Outcome: Ongoing     Problem: Pain:  Goal: Control of chronic pain  Description: Control of chronic pain  Outcome: Ongoing     Problem: Altered Mood, Depressive Behavior:  Goal: Able to verbalize acceptance of life and situations over which he or she has no control  Description: Able to verbalize acceptance of life and situations over which he or she has no control  8/4/2020 1100 by Hansel Last LPN  Outcome: Ongoing     Problem: Altered Mood, Depressive Behavior:  Goal: Able to verbalize and/or display a decrease in depressive symptoms  Description: Able to verbalize and/or display a decrease in depressive symptoms  8/4/2020 1100 by Hansel Last LPN  Outcome: Ongoing  Note: Patient is cooperative with fair eye contact, she denies suicidal and homicidal ideations but expresses some depression that she is learning to manage through group therapy, coping skills and tips. Patient is attempting to to cope with these stressors and accept the challenges it accompanies. Programming continues to be encouraged.      Problem: Altered Mood, Depressive Behavior:  Goal: Able to verbalize support systems  Description: Able to verbalize support systems  Outcome: Ongoing

## 2020-11-03 PROBLEM — F32.A DEPRESSION: Status: RESOLVED | Noted: 2017-01-20 | Resolved: 2020-11-03

## 2020-11-04 NOTE — GROUP NOTE
Group Therapy Note    Date: 7/30/2020    Group Start Time: 1430  Group End Time: 5705  Group Topic: Cognitive Skills    GOLD Petit, CTRS        Group Therapy Note    Attendees: 5/12         Pt did not participate in Cognitive Skills Group at 1430 when encouraged by RT due to resting in room. Pt was offered talk time as an alternative to group but declined.          Discipline Responsible: Psychoeducational Specialist        Signature:  Burna Fabry no

## 2021-04-30 ENCOUNTER — HOSPITAL ENCOUNTER (EMERGENCY)
Age: 41
Discharge: HOME | End: 2021-04-30
Attending: STUDENT IN AN ORGANIZED HEALTH CARE EDUCATION/TRAINING PROGRAM
Payer: COMMERCIAL

## 2021-04-30 VITALS
OXYGEN SATURATION: 97 % | DIASTOLIC BLOOD PRESSURE: 78 MMHG | SYSTOLIC BLOOD PRESSURE: 115 MMHG | RESPIRATION RATE: 16 BRPM | BODY MASS INDEX: 28.68 KG/M2 | HEIGHT: 64 IN | HEART RATE: 95 BPM | WEIGHT: 168 LBS | TEMPERATURE: 97.7 F

## 2021-04-30 DIAGNOSIS — M54.2 NECK PAIN: Primary | ICD-10-CM

## 2021-04-30 PROCEDURE — 99284 EMERGENCY DEPT VISIT MOD MDM: CPT

## 2021-04-30 RX ORDER — LIDOCAINE 4 G/G
1 PATCH TOPICAL DAILY
Qty: 5 PATCH | Refills: 0 | Status: SHIPPED | OUTPATIENT
Start: 2021-04-30

## 2021-04-30 RX ORDER — METHOCARBAMOL 750 MG/1
750 TABLET, FILM COATED ORAL 4 TIMES DAILY
Qty: 40 TABLET | Refills: 0 | Status: SHIPPED | OUTPATIENT
Start: 2021-04-30 | End: 2021-05-10

## 2021-04-30 ASSESSMENT — ENCOUNTER SYMPTOMS
EYE ITCHING: 0
FACIAL SWELLING: 0
VOMITING: 0
DIARRHEA: 0
RHINORRHEA: 0
SHORTNESS OF BREATH: 0
ABDOMINAL PAIN: 0
NAUSEA: 0
COLOR CHANGE: 0
PHOTOPHOBIA: 0
COUGH: 0
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
## 2021-10-21 ENCOUNTER — APPOINTMENT (OUTPATIENT)
Dept: GENERAL RADIOLOGY | Age: 41
End: 2021-10-21
Payer: COMMERCIAL

## 2021-10-21 ENCOUNTER — APPOINTMENT (OUTPATIENT)
Dept: CT IMAGING | Age: 41
End: 2021-10-21
Payer: COMMERCIAL

## 2021-10-21 ENCOUNTER — HOSPITAL ENCOUNTER (EMERGENCY)
Age: 41
Discharge: HOME | End: 2021-10-21
Attending: EMERGENCY MEDICINE
Payer: COMMERCIAL

## 2021-10-21 VITALS
HEART RATE: 97 BPM | DIASTOLIC BLOOD PRESSURE: 76 MMHG | RESPIRATION RATE: 16 BRPM | HEIGHT: 65 IN | WEIGHT: 155 LBS | OXYGEN SATURATION: 97 % | BODY MASS INDEX: 25.83 KG/M2 | TEMPERATURE: 97.4 F | SYSTOLIC BLOOD PRESSURE: 124 MMHG

## 2021-10-21 DIAGNOSIS — S16.1XXA STRAIN OF NECK MUSCLE, INITIAL ENCOUNTER: ICD-10-CM

## 2021-10-21 DIAGNOSIS — R07.81 RIB PAIN ON RIGHT SIDE: Primary | ICD-10-CM

## 2021-10-21 PROCEDURE — 99284 EMERGENCY DEPT VISIT MOD MDM: CPT

## 2021-10-21 PROCEDURE — 72125 CT NECK SPINE W/O DYE: CPT

## 2021-10-21 PROCEDURE — 71101 X-RAY EXAM UNILAT RIBS/CHEST: CPT

## 2021-10-21 PROCEDURE — 6370000000 HC RX 637 (ALT 250 FOR IP): Performed by: PHYSICIAN ASSISTANT

## 2021-10-21 RX ORDER — HYDROCODONE BITARTRATE AND ACETAMINOPHEN 5; 325 MG/1; MG/1
1 TABLET ORAL EVERY 4 HOURS PRN
Qty: 12 TABLET | Refills: 0 | Status: SHIPPED | OUTPATIENT
Start: 2021-10-21 | End: 2021-10-24

## 2021-10-21 RX ORDER — LIDOCAINE 4 G/G
1 PATCH TOPICAL DAILY
Status: DISCONTINUED | OUTPATIENT
Start: 2021-10-21 | End: 2021-10-21 | Stop reason: HOSPADM

## 2021-10-21 RX ORDER — HYDROCODONE BITARTRATE AND ACETAMINOPHEN 5; 325 MG/1; MG/1
1 TABLET ORAL EVERY 4 HOURS PRN
Status: DISCONTINUED | OUTPATIENT
Start: 2021-10-21 | End: 2021-10-21 | Stop reason: HOSPADM

## 2021-10-21 RX ADMIN — HYDROCODONE BITARTRATE AND ACETAMINOPHEN 1 TABLET: 5; 325 TABLET ORAL at 18:04

## 2021-10-21 ASSESSMENT — PAIN SCALES - GENERAL: PAINLEVEL_OUTOF10: 10

## 2021-10-21 NOTE — ED PROVIDER NOTES
[address] ED  Emergency Department  Independent Attestation     Pt Name: Radha Tovar  MRN: [id number]  [place] 1980  Date of evaluation: 10/21/21       Radha Tovar is a 39 y.o. female who presents with Fall, Back Pain, and Rib Pain      I was personally available for consultation in the Emergency Department. The care is provided during an unprecedented national emergency due to the novel coronavirus, COVID-19.     Reji Campoverde DO  Attending Emergency Physician  [address] ED      (Please note that portions of this note were completed with a voice recognition program.  Efforts were made to edit the dictations but occasionally words are mis-transcribed.)        Reji Campoverde DO  10/21/21 7970

## 2021-10-21 NOTE — ED TRIAGE NOTES
Mode of arrival (squad #, walk in, police, etc) : walk in        Chief complaint(s): fall        Arrival Note (brief scenario, treatment PTA, etc). : Pt ws carrying laundry down her stairs and slipped and fell. Pt has right sided rib pain and back pain. Pt denies hitting head or LOC. Pt is unsure how many stairs she fell down.

## 2021-10-21 NOTE — ED PROVIDER NOTES
[address]  eMERGENCY dEPARTMENT eNCOUnter      Pt Name: Fransico Batista  MRN: [id number]  [place] 1980  Date of evaluation: 10/21/2021  Provider: Disha Roman Dr       Chief Complaint   Patient presents with   Costella Ismael Fall    Back Pain    Rib Pain     HISTORY OF PRESENT ILLNESS  (Location/Symptom, Timing/Onset, Context/Setting, Quality, Duration, Modifying Factors, Severity.)   Fransico Batista is a 39 y.o. female who presents to the emergency department with complaints of a fall. Patient states that she was walking down the stairs with a laundry basket and she tripped and fell. Patient does not have a handrail. She states she landed on her butt and right side. Did not hit her head or neck. She states most of her pain is in the right side of her ribs with complaints of a fall. Patient states that she was walking down the stairs with a laundry basket and she tripped and fell. Patient does not have a handrail. She states she landed on her butt and right side. Did not hit her head or neck. She states most of her pain is in the right side of her ribs and also on her cervical spine. She denies any head injury or loss of consciousness. This happened immediately prior to arrival.  Patient states that she did not take any medication prior to coming. She did not have any nausea vomiting light sensitivity dizziness or other syncopal-like symptoms. Nursing Notes were reviewed. REVIEW OF SYSTEMS    (2-9 systems for level 4, 10 or more for level 5)     Constitutional: Denies recent fever, chills, weakness. Visual: Denies recent change in vision, discharge or pain. ENT: Denies recent sore throat, nasal congestion, ear pain. Respiratory: Denies recent shortness of breath,  cough , wheezing or pleuritic chest pain. GI: denies any recent abdominal pain nausea vomiting or diarrhea. : denies any recent difficulty urinating or pain in the genitals. Musculoskeletal :  Denies neck pain back pain joint pain or recent trauma. Neurologic: denies any seizure activity headaches stroke like symptoms or syncope. Skin:  Denies any recent new rash itching or change in skin color. Psychiatric:  Denies any thoughts of suicide homicide. Patient does not voice any problems with anxiety or depression    Except as noted above the remainder of the review of systems was reviewed and negative.      PAST MEDICAL HISTORY         Diagnosis Date    Anxiety     Anxiety and depression     Cerebral aneurysm     Chronic left-sided low back pain     Chronic neck pain     Depression     Gallstones     Headache     PTSD (post-traumatic stress disorder)        SURGICAL HISTORY           Procedure Laterality Date    BRAIN ANEURYSM SURGERY      BRAIN SURGERY  2016    [facility] titanium aneurysm clip safe 3T per documentation, done at [facility] 2016     SECTION      x4    DILATION AND CURETTAGE OF UTERUS         CURRENT MEDICATIONS       Previous Medications    BUSPIRONE (BUSPAR) 10 MG TABLET    Take 1 tablet by mouth 3 times daily    LIDOCAINE 4 % EXTERNAL PATCH    Place 1 patch onto the skin daily    METFORMIN (GLUCOPHAGE) 500 MG TABLET    Take 1 tablet by mouth 2 times daily (with meals)    NORGESTIM-ETH ESTRAD TRIPHASIC (ORTHO TRI-CYCLEN, 28,) 0.18/0.215/0.25 MG-35 MCG TABS    Take 1 tablet by mouth daily    ONDANSETRON (ZOFRAN) 4 MG TABLET    Take 1 tablet by mouth every 8 hours as needed for Nausea    QUETIAPINE (SEROQUEL) 50 MG TABLET    Take 3 tablets by mouth nightly    VITAMIN D (CHOLECALCIFEROL) 1000 UNIT TABS TABLET    Take 1 tablet by mouth daily       ALLERGIES     Doxycycline, Hydroxyzine hcl, Prednisone, and Penicillins    FAMILY HISTORY           Problem Relation Age of Onset    Cancer Father     Stroke Father      Family Status   Relation Name Status    Father  (Not Specified)        SOCIAL HISTORY      reports that she quit smoking about 6 years ago. Her smoking use included cigarettes. She has never used smokeless tobacco. She reports current alcohol use. She reports that she does not use drugs. PHYSICAL EXAM    (up to 7 for level 4, 8 or more for level 5)     ED Triage Vitals   BP Temp Temp Source Pulse Resp SpO2 Height Weight   10/21/21 1623 10/21/21 1621 10/21/21 1621 10/21/21 1621 10/21/21 1621 10/21/21 1621 10/21/21 1621 10/21/21 1621   124/76 97.4 °F (36.3 °C) Temporal 97 16 97 % 5' 5\" (1.651 m) 155 lb (70.3 kg)     Physical Exam  Vitals and nursing note reviewed. Constitutional:       Appearance: Normal appearance. She is normal weight. HENT:      Head: Normocephalic and atraumatic. Nose: Nose normal.      Mouth/Throat:      Mouth: Mucous membranes are moist.      Pharynx: Oropharynx is clear. Eyes:      Extraocular Movements: Extraocular movements intact. Conjunctiva/sclera: Conjunctivae normal.      Pupils: Pupils are equal, round, and reactive to light. Cardiovascular:      Rate and Rhythm: Normal rate and regular rhythm. Pulses: Normal pulses. Pulmonary:      Effort: Pulmonary effort is normal.      Breath sounds: Normal breath sounds. Abdominal:      General: Abdomen is flat. Bowel sounds are normal.   Musculoskeletal:         General: Normal range of motion. Cervical back: Normal range of motion and neck supple. Skin:     General: Skin is warm. Neurological:      Mental Status: She is alert. Vital Signs reviewed    MEDICAL DECISION MAKING:     Discussed with the patient that we will go ahead and get a CT of her cervical spine along with an x-ray of the ribs. We will treat patient with lidocaine patches and pain medication.     DIAGNOSTIC RESULTS     EKG: Not clinically indicated    RADIOLOGY:   Non-plain film images such as CT, Ultrasound and MRI are read by the radiologist.   Interpretation per the Radiologist below:     CT Cervical Spine WO Contrast   Final Result   No acute abnormality of the cervical spine. XR RIBS RIGHT INCLUDE CHEST (MIN 3 VIEWS)   Final Result   No acute abnormalities seen in the chest or right ribs      Incidental note made of gallstones           LABS: Not clinically indicated  Labs Reviewed - No data to display    All other labs were within normal range or not returned as of this dictation. EMERGENCY DEPARTMENT COURSE and DIFFERENTIAL DIAGNOSIS/MDM:   Vitals:    Vitals:    10/21/21 1621 10/21/21 1623   BP:  124/76   Pulse: 97    Resp: 16    Temp: 97.4 °F (36.3 °C)    TempSrc: Temporal    SpO2: 97%    Weight: 155 lb (70.3 kg)    Height: 5' 5\" (1.651 m)        Orders Placed This Encounter   Medications    lidocaine 4 % external patch 1 patch    HYDROcodone-acetaminophen (NORCO) 5-325 MG per tablet 1 tablet    HYDROcodone-acetaminophen (NORCO) 5-325 MG per tablet     Sig: Take 1 tablet by mouth every 4 hours as needed for Pain for up to 3 days. Intended supply: 3 days. Take lowest dose possible to manage pain     Dispense:  12 tablet     Refill:  0       CONSULTS:  None    PROCEDURES:  None    FINAL IMPRESSION      1. Rib pain on right side    2. Strain of neck muscle, initial encounter          DISPOSITION/PLAN   DISPOSITION Decision To Discharge 10/21/2021 05:44:07 PM      PATIENT REFERRED TO:  LISA Sheldon - CNP  [address]  [address]    Schedule an appointment as soon as possible for a visit   If symptoms worsen      DISCHARGE MEDICATIONS:  New Prescriptions    HYDROCODONE-ACETAMINOPHEN (NORCO) 5-325 MG PER TABLET    Take 1 tablet by mouth every 4 hours as needed for Pain for up to 3 days. Intended supply: 3 days.  Take lowest dose possible to manage pain       (Please note that portions of this note were completed with a voice recognition program.  Efforts were made to edit the dictations but occasionally words are mis-transcribed.)    RAJ Roy PA-C  10/21/21 7190

## 2021-11-16 ENCOUNTER — HOSPITAL ENCOUNTER (EMERGENCY)
Age: 41
Discharge: HOME | End: 2021-11-16
Attending: STUDENT IN AN ORGANIZED HEALTH CARE EDUCATION/TRAINING PROGRAM
Payer: MEDICARE

## 2021-11-16 VITALS
HEART RATE: 104 BPM | HEIGHT: 65 IN | RESPIRATION RATE: 16 BRPM | DIASTOLIC BLOOD PRESSURE: 64 MMHG | OXYGEN SATURATION: 99 % | TEMPERATURE: 97.5 F | BODY MASS INDEX: 25.83 KG/M2 | WEIGHT: 155 LBS | SYSTOLIC BLOOD PRESSURE: 152 MMHG

## 2021-11-16 DIAGNOSIS — F32.A DEPRESSION, UNSPECIFIED DEPRESSION TYPE: Primary | ICD-10-CM

## 2021-11-16 PROCEDURE — 99284 EMERGENCY DEPT VISIT MOD MDM: CPT

## 2021-11-16 ASSESSMENT — ENCOUNTER SYMPTOMS
ABDOMINAL PAIN: 0
NAUSEA: 0
VOMITING: 0
EYE ITCHING: 0
COLOR CHANGE: 0
SHORTNESS OF BREATH: 0
FACIAL SWELLING: 0
PHOTOPHOBIA: 0
RHINORRHEA: 0
COUGH: 0
DIARRHEA: 0

## 2021-11-16 NOTE — ED TRIAGE NOTES
Mode of arrival (squad #, walk in, police, etc) : walk in        Chief complaint(s): depression        Arrival Note (brief scenario, treatment PTA, etc). : Pt states she is feeling depressed. Pt states she has no family or friends. Pt states her phone is shut off, she was just fired, she can't apply for unemployment right. Pt reports relationship with  is rough. Pt denies suicidal or homicidal ideations. Pt states she is just wanting to talk to someone. Pt is tearful in triage. C= \"Have you ever felt that you should Cut down on your drinking? \"  No  A= \"Have people Annoyed you by criticizing your drinking? \"  No  G= \"Have you ever felt bad or Guilty about your drinking? \"  No  E= \"Have you ever had a drink as an Eye-opener first thing in the morning to steady your nerves or to help a hangover? \"  No      Deferred []      Reason for deferring: N/A    *If yes to two or more: probable alcohol abuse. *

## 2021-11-17 NOTE — ED NOTES
Writer met with patient today to discuss current stressors. Patient is very tearful while talking with writer. Patient reports her life is \"a mess. \" Patient reports today she got into an argument with her  and felt like she had nobody to talk to anymore. Patient reports her  and her both lost their jobs. Patient reports she fears they will be evicted from their home soon as well. Patient denies suicidal or homicidal ideations. Patient is seeking resources and support. SW offers sympathetic listening to the patient. SW offers encouragement to seek employment and housing. SW provides information for community resources.

## 2021-11-17 NOTE — ED NOTES
SW assisted pt in applying for a [employer] cell phone. SW provided pt with a list of food pantry options and outpatient follow up based on pt's insurance. Pt strongly encouraged to utilize these resources. SW assisted pt creating a safety plan. SW discussed with pt warning signs, coping skills, and people/agencies who pt can reach out for help when feeling suicidal. SW made pt aware of ways to make pt's environment safer in times of crisis. Pt provided with the National Suicide Prevention Line: 8-650.370.4327 or the text number 453756 and strongly encouraged to utilize this resource if needed. SW encouraged pt to return to the hospital or call 911 if pt's symptoms worsen or pt is having thoughts of wanting to harm self/others. Pt verbalized pt's understanding.
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
## 2022-02-17 ENCOUNTER — HOSPITAL ENCOUNTER (EMERGENCY)
Age: 42
Discharge: LEFT BEFORE DISCHARGE | End: 2022-02-17

## 2022-06-26 ENCOUNTER — HOSPITAL ENCOUNTER (EMERGENCY)
Age: 42
Discharge: HOME | End: 2022-06-27
Attending: EMERGENCY MEDICINE
Payer: COMMERCIAL

## 2022-06-26 VITALS
OXYGEN SATURATION: 99 % | HEIGHT: 64 IN | HEART RATE: 80 BPM | RESPIRATION RATE: 12 BRPM | SYSTOLIC BLOOD PRESSURE: 129 MMHG | BODY MASS INDEX: 28.68 KG/M2 | TEMPERATURE: 98.6 F | WEIGHT: 168 LBS | DIASTOLIC BLOOD PRESSURE: 92 MMHG

## 2022-06-26 DIAGNOSIS — S16.1XXA STRAIN OF NECK MUSCLE, INITIAL ENCOUNTER: ICD-10-CM
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
## 2022-06-26 LAB
ABSOLUTE EOS #: <0.03 K/UL (ref 0–0.44)
ABSOLUTE IMMATURE GRANULOCYTE: 0.03 K/UL (ref 0–0.3)
ABSOLUTE LYMPH #: 2.2 K/UL (ref 1.1–3.7)
ABSOLUTE MONO #: 0.69 K/UL (ref 0.1–1.2)
ANION GAP SERPL CALCULATED.3IONS-SCNC: 11 MMOL/L (ref 9–17)
BASOPHILS # BLD: 1 % (ref 0–2)
BASOPHILS ABSOLUTE: 0.07 K/UL (ref 0–0.2)
BUN BLDV-MCNC: 11 MG/DL (ref 6–20)
CALCIUM SERPL-MCNC: 8.8 MG/DL (ref 8.6–10.4)
CHLORIDE BLD-SCNC: 103 MMOL/L (ref 98–107)
CO2: 23 MMOL/L (ref 20–31)
CREAT SERPL-MCNC: 0.78 MG/DL (ref 0.5–0.9)
EOSINOPHILS RELATIVE PERCENT: 0 % (ref 1–4)
GFR AFRICAN AMERICAN: >60 ML/MIN
GFR NON-AFRICAN AMERICAN: >60 ML/MIN
GFR SERPL CREATININE-BSD FRML MDRD: ABNORMAL ML/MIN/{1.73_M2}
GLUCOSE BLD-MCNC: 112 MG/DL (ref 70–99)
HCG QUALITATIVE: NEGATIVE
HCT VFR BLD CALC: 40.8 % (ref 36.3–47.1)
HEMOGLOBIN: 13.7 G/DL (ref 11.9–15.1)
IMMATURE GRANULOCYTES: 0 %
LYMPHOCYTES # BLD: 19 % (ref 24–43)
MCH RBC QN AUTO: 32.1 PG (ref 25.2–33.5)
MCHC RBC AUTO-ENTMCNC: 33.6 G/DL (ref 28.4–34.8)
MCV RBC AUTO: 95.6 FL (ref 82.6–102.9)
MONOCYTES # BLD: 6 % (ref 3–12)
NRBC AUTOMATED: 0 PER 100 WBC
PDW BLD-RTO: 12.2 % (ref 11.8–14.4)
PLATELET # BLD: 304 K/UL (ref 138–453)
PMV BLD AUTO: 9.6 FL (ref 8.1–13.5)
POTASSIUM SERPL-SCNC: 3.9 MMOL/L (ref 3.7–5.3)
RBC # BLD: 4.27 M/UL (ref 3.95–5.11)
SEG NEUTROPHILS: 74 % (ref 36–65)
SEGMENTED NEUTROPHILS ABSOLUTE COUNT: 8.38 K/UL (ref 1.5–8.1)
SODIUM BLD-SCNC: 137 MMOL/L (ref 135–144)
WBC # BLD: 11.4 K/UL (ref 3.5–11.3)

## 2022-06-26 PROCEDURE — 96372 THER/PROPH/DIAG INJ SC/IM: CPT

## 2022-06-26 PROCEDURE — 84703 CHORIONIC GONADOTROPIN ASSAY: CPT

## 2022-06-26 PROCEDURE — 99285 EMERGENCY DEPT VISIT HI MDM: CPT

## 2022-06-26 PROCEDURE — 85025 COMPLETE CBC W/AUTO DIFF WBC: CPT

## 2022-06-26 PROCEDURE — 80048 BASIC METABOLIC PNL TOTAL CA: CPT

## 2022-06-26 PROCEDURE — 93005 ELECTROCARDIOGRAM TRACING: CPT | Performed by: STUDENT IN AN ORGANIZED HEALTH CARE EDUCATION/TRAINING PROGRAM

## 2022-06-26 ASSESSMENT — PAIN SCALES - GENERAL: PAINLEVEL_OUTOF10: 8

## 2022-06-26 ASSESSMENT — PAIN - FUNCTIONAL ASSESSMENT: PAIN_FUNCTIONAL_ASSESSMENT: 0-10

## 2022-06-26 ASSESSMENT — PAIN DESCRIPTION - LOCATION: LOCATION: HEAD

## 2022-06-26 NOTE — Clinical Note
Fer Sanches was seen and treated in our emergency department on 6/26/2022. She may return to work on 06/28/2022. If you have any questions or concerns, please don't hesitate to call.       Dieudonne Garcia, DO

## 2022-06-27 ENCOUNTER — APPOINTMENT (OUTPATIENT)
Dept: CT IMAGING | Age: 42
End: 2022-06-27
Payer: COMMERCIAL

## 2022-06-27 PROCEDURE — 6360000004 HC RX CONTRAST MEDICATION: Performed by: STUDENT IN AN ORGANIZED HEALTH CARE EDUCATION/TRAINING PROGRAM

## 2022-06-27 PROCEDURE — 70496 CT ANGIOGRAPHY HEAD: CPT

## 2022-06-27 PROCEDURE — 70450 CT HEAD/BRAIN W/O DYE: CPT

## 2022-06-27 PROCEDURE — 72128 CT CHEST SPINE W/O DYE: CPT

## 2022-06-27 PROCEDURE — 72125 CT NECK SPINE W/O DYE: CPT

## 2022-06-27 PROCEDURE — 6370000000 HC RX 637 (ALT 250 FOR IP): Performed by: STUDENT IN AN ORGANIZED HEALTH CARE EDUCATION/TRAINING PROGRAM

## 2022-06-27 PROCEDURE — 6360000002 HC RX W HCPCS: Performed by: STUDENT IN AN ORGANIZED HEALTH CARE EDUCATION/TRAINING PROGRAM

## 2022-06-27 RX ORDER — ACETAMINOPHEN 325 MG/1
650 TABLET ORAL ONCE
Status: COMPLETED | OUTPATIENT
Start: 2022-06-27 | End: 2022-06-27

## 2022-06-27 RX ORDER — ORPHENADRINE CITRATE 30 MG/ML
60 INJECTION INTRAMUSCULAR; INTRAVENOUS ONCE
Status: COMPLETED | OUTPATIENT
Start: 2022-06-27 | End: 2022-06-27

## 2022-06-27 RX ORDER — ACETAMINOPHEN 325 MG/1
650 TABLET ORAL EVERY 6 HOURS PRN
Qty: 120 TABLET | Refills: 0 | Status: SHIPPED | OUTPATIENT
Start: 2022-06-27

## 2022-06-27 RX ORDER — METHOCARBAMOL 500 MG/1
750 TABLET, FILM COATED ORAL 3 TIMES DAILY
Qty: 18 TABLET | Refills: 0 | Status: SHIPPED | OUTPATIENT
Start: 2022-06-27 | End: 2022-07-01

## 2022-06-27 RX ADMIN — IOPAMIDOL 90 ML: 755 INJECTION, SOLUTION INTRAVENOUS at 00:14

## 2022-06-27 RX ADMIN — ACETAMINOPHEN 650 MG: 325 TABLET ORAL at 00:56

## 2022-06-27 RX ADMIN — ORPHENADRINE CITRATE 60 MG: 30 INJECTION INTRAMUSCULAR; INTRAVENOUS at 02:03

## 2022-06-27 ASSESSMENT — ENCOUNTER SYMPTOMS
SINUS PAIN: 0
SINUS PRESSURE: 0
NAUSEA: 0
SHORTNESS OF BREATH: 0
ABDOMINAL PAIN: 0
CHEST TIGHTNESS: 0
DIARRHEA: 0
TROUBLE SWALLOWING: 0
VOMITING: 0
COUGH: 0
COLOR CHANGE: 0
SORE THROAT: 0
BACK PAIN: 0
CONSTIPATION: 0

## 2022-06-27 NOTE — ED NOTES
Pt presents to the ED with C/O having a fall about an hour ago. Pt stated that she tripped on some boxes and fell back wards. Pt stated that she is feeling numbness and tingling on her face. Pt stated that the numbness is from Sx but she is experiencing this due to fall. Pt denies LOC, but pt is experiencing HA. Pt has a Hx of aneurysm, 2016. Pt denies blood thinners. Pt was placed on full cardiac monitor.       Kt Mcmahon RN  06/26/22 1757

## 2022-06-27 NOTE — ED PROVIDER NOTES
[facility] ED  Emergency Department Encounter  Emergency Medicine Resident     Pt Name: Ricky Barrera  MRN: [id number]  [place] 1980  Date of evaluation: 22  PCP:  LISA Gomes CNP    CHIEF COMPLAINT       Chief Complaint   Patient presents with   Kanika Brittany    Headache       HISTORY [facility]  (Location/Symptom, Timing/Onset, Context/Setting, Quality, Duration, Modifying Factors,Severity.)      Ricky Barrera is a 39 y. o.yo female who presents with headache after a fall. Patient is very anxious stating that she has history of cerebral aneurysm that has been clipped. Patient states that she had a mechanical fall this evening when she had a wall. Patient states since then she has had a severe headache and she is concerned that she is caused a new aneurysm. Patient denies any nausea or vomiting. She does report some sensory changes to the right side but she states that this happens to her every time she injures herself since she had the aneurysm. She denies any weakness or vision changes. Patient is reporting headache neck and back pain. She denies any blood thinners, denies any loss of consciousness. PAST MEDICAL / SURGICAL / SOCIAL / FAMILY HISTORY      has a past medical history of Anxiety, Anxiety and depression, Cerebral aneurysm, Chronic left-sided low back pain, Chronic neck pain, Depression, Gallstones, Headache, and PTSD (post-traumatic stress disorder). has a past surgical history that includes  section; brain surgery (2016); Brain aneurysm surgery; and Dilation and curettage of uterus.      Social History     Socioeconomic History    Marital status:      Spouse name: Not on file    Number of children: 11    Years of education: some college    Highest education level: Not on file   Occupational History    Not on file   Tobacco Use    Smoking status: Former Smoker     Types: Cigarettes     Quit date: 2015     Years since quittin.1    Smokeless tobacco: Never Used   Vaping Use    Vaping Use: Never used   Substance and Sexual Activity    Alcohol use: Yes     Comment: social    Drug use: No    Sexual activity: Yes     Partners: Male   Other Topics Concern    Not on file   Social History Narrative    Not on file     Social Determinants of Health     Financial Resource Strain:     Difficulty of Paying Living Expenses: Not on file   Food Insecurity:     Worried About Running Out of Food in the Last Year: Not on file    [employer] of Food in the Last Year: Not on file   Transportation Needs:     Lack of Transportation (Medical): Not on file    Lack of Transportation (Non-Medical): Not on file   Physical Activity:     Days of Exercise per Week: Not on file    Minutes of Exercise per Session: Not on file   Stress:     Feeling of Stress : Not on file   Social Connections:     Frequency of Communication with Friends and Family: Not on file    Frequency of Social Gatherings with Friends and Family: Not on file    Attends Roman [employer] Services: Not on file    Active Member of [address] [place] [address] or Organizations: Not on file    Attends Club or Organization Meetings: Not on file    Marital Status: Not on file   Intimate Partner Violence:     Fear of Current or Ex-Partner: Not on file    Emotionally Abused: Not on file    Physically Abused: Not on file    Sexually Abused: Not on file   Housing Stability:     Unable to Pay for Housing in the Last Year: Not on file    Number of [place] in the Last Year: Not on file    Unstable Housing in the Last Year: Not on file       Family History   Problem Relation Age of Onset    Cancer Father     Stroke Father         Allergies:  Doxycycline, Hydroxyzine hcl, Prednisone, and Penicillins    Home Medications:  Prior to Admission medications    Medication Sig Start Date End Date Taking?  Authorizing Provider   methocarbamol (ROBAXIN) 500 MG tablet Take 1.5 tablets by mouth 3 times daily for 4 days 6/27/22 7/1/22 Yes Bryson Seo, DO   acetaminophen (TYLENOL) 325 MG tablet Take 2 tablets by mouth every 6 hours as needed for Pain 6/27/22  Yes Bryson Seo, DO   lidocaine 4 % external patch Place 1 patch onto the skin daily 4/30/21   Seth Jacobson MD   busPIRone (BUSPAR) 10 MG tablet Take 1 tablet by mouth 3 times daily 8/4/20   Prudencio Henley MD   metFORMIN (GLUCOPHAGE) 500 MG tablet Take 1 tablet by mouth 2 times daily (with meals) 8/4/20   Prudencio Henley MD   ondansetron (ZOFRAN) 4 MG tablet Take 1 tablet by mouth every 8 hours as needed for Nausea 8/4/20   Prudencio Henley MD   QUEtiapine (SEROQUEL) 50 MG tablet Take 3 tablets by mouth nightly 8/4/20   Prudencio Henley MD   vitamin D (CHOLECALCIFEROL) 1000 UNIT TABS tablet Take 1 tablet by mouth daily 8/4/20   Prudencio Henley MD   Norgestim-Eth Marriariana Florinda Triphasic (ORTHO TRI-CYCLEN, 28,) 0.18/0.215/0.25 MG-35 MCG TABS Take 1 tablet by mouth daily    Historical Provider, MD       REVIEW OFSYSTEMS    (2-9 systems for level 4, 10 or more for level 5)      Review of Systems   Constitutional: Negative for chills, diaphoresis, fatigue and fever. HENT: Negative for congestion, sinus pressure, sinus pain, sore throat and trouble swallowing. Eyes: Negative for visual disturbance. Respiratory: Negative for cough, chest tightness and shortness of breath. Cardiovascular: Negative for chest pain, palpitations and leg swelling. Gastrointestinal: Negative for abdominal pain, constipation, diarrhea, nausea and vomiting. Genitourinary: Negative for difficulty urinating, dysuria, flank pain and urgency. Musculoskeletal: Positive for arthralgias and neck pain. Negative for back pain and neck stiffness. Skin: Negative for color change, pallor and rash. Neurological: Positive for numbness and headaches. Negative for dizziness, tremors, syncope, speech difficulty, weakness and light-headedness.        PHYSICAL EXAM   (up to 7 for level 4, 8 or more forlevel 5)      INITIAL VITALS:   ED Triage Vitals   BP Temp Temp src Pulse Resp SpO2 Height Weight   129/92 98.6 oral 80 12 99% -- --       Physical Exam  Constitutional:       General: She is not in acute distress. Appearance: She is not ill-appearing. HENT:      Head: Normocephalic and atraumatic. Right Ear: External ear normal.      Left Ear: External ear normal.      Nose: Nose normal. No rhinorrhea. Eyes:      Extraocular Movements: Extraocular movements intact. Pupils: Pupils are equal, round, and reactive to light. Comments: Supraorbital ecchymosis that appears to be old   Cardiovascular:      Rate and Rhythm: Normal rate and regular rhythm. Pulses: Normal pulses. Heart sounds: No murmur heard. Pulmonary:      Effort: Pulmonary effort is normal.      Breath sounds: Normal breath sounds. Abdominal:      Palpations: Abdomen is soft. Tenderness: There is no abdominal tenderness. Musculoskeletal:         General: No swelling. Normal range of motion. Cervical back: Normal range of motion and neck supple. Tenderness (Tenderness to midline to lower C-spine and upper T-spine.) present. Skin:     General: Skin is warm and dry. Neurological:      General: No focal deficit present. Mental Status: She is alert and oriented to person, place, and time. Cranial Nerves: No cranial nerve deficit. Sensory: No sensory deficit. Motor: No weakness.       Coordination: Coordination normal.         DIFFERENTIAL  DIAGNOSIS     PLAN (LABS / IMAGING / EKG):  Orders Placed This Encounter   Procedures    CT Head WO Contrast    CT Cervical Spine WO Contrast    CT THORACIC SPINE WO CONTRAST    CTA HEAD NECK W CONTRAST    CBC with Auto Differential    Basic Metabolic Panel w/ Reflex to MG    HCG Qualitative, Serum    EKG 12 Lead       MEDICATIONS ORDERED:  Orders Placed This Encounter   Medications    iopamidol (ISOVUE-370) 76 % injection 90 mL    acetaminophen (TYLENOL) tablet 650 mg    methocarbamol (ROBAXIN) 500 MG tablet     Sig: Take 1.5 tablets by mouth 3 times daily for 4 days     Dispense:  18 tablet     Refill:  0    acetaminophen (TYLENOL) 325 MG tablet     Sig: Take 2 tablets by mouth every 6 hours as needed for Pain     Dispense:  120 tablet     Refill:  0    orphenadrine (NORFLEX) injection 60 mg       DDX: Fall, cervical strain, anemia, electrolyte abnormality    Initial MDM/Plan: 39 y.o. female who presents with concerns for headache after a fall. Patient has history of aneurysm status postrepair. Patient is reporting severe headache after she had a mechanical fall hitting her head on a wall. No neurodeficits on exam.  Does have midline tenderness in the C and T-spine. We will plan for blood work CTs and EKG. DIAGNOSTIC RESULTS / EMERGENCYDEPARTMENT COURSE / MDM     LABS:  Labs Reviewed   CBC WITH AUTO DIFFERENTIAL - Abnormal; Notable for the following components:       Result Value    WBC 11.4 (*)     Seg Neutrophils 74 (*)     Lymphocytes 19 (*)     Eosinophils % 0 (*)     Segs Absolute 8.38 (*)     All other components within normal limits   BASIC METABOLIC PANEL W/ REFLEX TO MG FOR LOW K - Abnormal; Notable for the following components:    Glucose 112 (*)     All other components within normal limits   HCG, SERUM, QUALITATIVE         RADIOLOGY:  CT Head WO Contrast    Result Date: 6/27/2022  EXAMINATION: CT OF THE HEAD WITHOUT CONTRAST  6/27/2022 12:12 am TECHNIQUE: CT of the head was performed without the administration of intravenous contrast. Automated exposure control, iterative reconstruction, and/or weight based adjustment of the mA/kV was utilized to reduce the radiation dose to as low as reasonably achievable.  COMPARISON: 06/25/2020 HISTORY: ORDERING SYSTEM PROVIDED HISTORY: Fall, Hx of aneurysm TECHNOLOGIST PROVIDED HISTORY: Fall, Hx of aneurysm Decision Support Exception - unselect if not a suspected or confirmed emergency medical condition->Emergency Medical Condition (MA) Is the patient pregnant?->No Reason for Exam:  Fall, Hx of aneurysm FINDINGS: BRAIN/VENTRICLES: Left pterional and anterior temporal craniotomy. There are aneurysm clips identified along the MCA. The there is no acute intracranial hemorrhage, mass effect or midline shift. No abnormal extra-axial fluid collection. The gray-white differentiation is maintained without evidence of an acute infarct. There is no evidence of hydrocephalus. ORBITS: The visualized portion of the orbits demonstrate no acute abnormality. SINUSES: Mild paranasal sinus disease with polypoid mucosal thickening identified right maxillary sinus. Mastoids grossly clear SOFT TISSUES/SKULL:  No acute abnormality of the visualized skull or soft tissues. No acute intracranial abnormality. Distal MCA aneurysm clips. Overlying craniotomy. CT Cervical Spine WO Contrast    Result Date: 6/27/2022  EXAMINATION: CT OF THE CERVICAL SPINE WITHOUT CONTRAST 6/27/2022 12:12 am TECHNIQUE: CT of the cervical spine was performed without the administration of intravenous contrast. Multiplanar reformatted images are provided for review. Automated exposure control, iterative reconstruction, and/or weight based adjustment of the mA/kV was utilized to reduce the radiation dose to as low as reasonably achievable. COMPARISON: 10/21/2021 HISTORY: ORDERING SYSTEM PROVIDED HISTORY: Fall, Midline TTP TECHNOLOGIST PROVIDED HISTORY: Fall, Midline TTP Decision Support Exception - unselect if not a suspected or confirmed emergency medical condition->Emergency Medical Condition (MA) Is the patient pregnant?->No Reason for Exam: Fall, Hx of aneurysm FINDINGS: BONES/ALIGNMENT: There is no acute fracture or traumatic malalignment. DEGENERATIVE CHANGES: No significant degenerative changes. SOFT TISSUES: There is no prevertebral soft tissue swelling.      No acute fracture or traumatic malalignment of the cervical spine.     CT THORACIC SPINE WO CONTRAST    1. No evidence of thoracic spine fracture or listhesis. CTA HEAD NECK W CONTRAST    Result Date: 6/27/2022  EXAMINATION: CTA OF THE HEAD AND NECK WITH CONTRAST 6/27/2022 12:12 am: TECHNIQUE: CTA of the head and neck was performed with the administration of intravenous contrast. Multiplanar reformatted images are provided for review. MIP images are provided for review. Stenosis of the internal carotid arteries measured using NASCET criteria. Automated exposure control, iterative reconstruction, and/or weight based adjustment of the mA/kV was utilized to reduce the radiation dose to as low as reasonably achievable. COMPARISON: None. HISTORY: ORDERING SYSTEM PROVIDED HISTORY: Hx of aneurysm, Fall FINDINGS: CTA NECK: AORTIC ARCH/ARCH VESSELS: No dissection or arterial injury. No significant stenosis of the brachiocephalic or subclavian arteries. CAROTID ARTERIES: No dissection, arterial injury, or hemodynamically significant stenosis by NASCET criteria. VERTEBRAL ARTERIES: No dissection, arterial injury, or significant stenosis. SOFT TISSUES: The lung apices are clear. No cervical or superior mediastinal lymphadenopathy. The larynx and pharynx are unremarkable. No acute abnormality of the salivary and thyroid glands. BONES: No acute osseous abnormality. CTA HEAD: ANTERIOR CIRCULATION: No significant stenosis of the intracranial internal carotid, anterior cerebral, or middle cerebral arteries. No aneurysm. Postoperative changes from prior surgical clipping of left MCA bifurcation aneurysm. POSTERIOR CIRCULATION: No significant stenosis of the vertebral, basilar, or posterior cerebral arteries. No aneurysm. OTHER: No dural venous sinus thrombosis on this non-dedicated study. BRAIN: No mass effect or midline shift. No extra-axial fluid collection. The gray-white differentiation is maintained. Unremarkable CTA of the head and neck.  Postoperative changes from prior surgical clipping of left MCA bifurcation aneurysm. EKG      All EKG's are interpreted by the Emergency Department Physicianwho either signs or Co-signs this chart in the absence of a cardiologist.    EMERGENCY DEPARTMENT COURSE:  ED Course as of 06/27/22 0520   Sun Jun 26, 2022   2357 Labs unremarkable  [CD]   Mon Jun 27, 2022   0053 CT head unremarkable. No changes to the aneurysm clips. [CD]   8622 Cervical and thoracic spine unremarkable. [CD]   0134 Unremarkable CTA. Will discharge patient home [CD]   [id number] Updated patient on results. Patient did drive herself here. I informed patient if she is able to get a ride home I can give her a shot of a muscle relaxer and then discharge. If not I will send her home with a prescription. [CD]      ED Course User Index  [CD] Luis Plaza DO          PROCEDURES:  None    CONSULTS:  None    CRITICAL CARE:  20 minutes    FINAL IMPRESSION      1. Fall, initial encounter    2.  Strain of neck muscle, initial encounter          DISPOSITION / PLAN     DISPOSITION Decision To Discharge 06/27/2022 01:43:07 AM      PATIENT REFERRED TO:  LISA Esteves - CNP  [address]  [place] [address] [facility]  [phone]    Call in 2 days  For ER follow-up    [facility]  [address] [address]  [phone]  Go to   If symptoms worsen      DISCHARGE MEDICATIONS:  Discharge Medication List as of 6/27/2022  2:02 AM      START taking these medications    Details   methocarbamol (ROBAXIN) 500 MG tablet Take 1.5 tablets by mouth 3 times daily for 4 days, Disp-18 tablet, R-0Print      acetaminophen (TYLENOL) 325 MG tablet Take 2 tablets by mouth every 6 hours as needed for Pain, Disp-120 tablet, R-0Print             Luis Plaza DO  Emergency Medicine Resident    (Please note that portions of this note were completed with a voice recognition program.Efforts were made to edit the dictations but occasionally words are mis-transcribed.) Shaye Longo, DO  Resident  06/27/22 [id number]

## 2022-06-27 NOTE — ED PROVIDER NOTES
[facility] [address] [facility]     Emergency Department     Faculty Attestation        I performed a history and physical examination of the patient and discussed management with the resident. I reviewed the residents note and agree with the documented findings and plan of care. Any areas of disagreement are noted on the chart. I was personally present for the key portions of any procedures. I have documented in the chart those procedures where I was not present during the key portions. I have reviewed the emergency nurses triage note. I agree with the chief complaint, past medical history, past surgical history, allergies, medications, social and family history as documented unless otherwise noted below. For mid-level providers such as nurse practitioners as well as physicians assistants:    I have personally seen and evaluated the patient. I find the patient's history and physical exam are consistent with NP/PA documentation. I agree with the care provided, treatment rendered, disposition, & follow-up plan. Additional findings are as noted. Vital Signs: BP (!) 129/92   Pulse 80   Temp 98.6 °F (37 °C) (Oral)   Resp 12   Ht 5' 4\" (1.626 m)   Wt 168 lb (76.2 kg)   SpO2 99%   BMI 28.84 kg/m²   PCP:  Niru Olvera, APRN - CNP    Pertinent Comments:     Had a mechanical fall where she slipped fell backwards hitting her head. No loss of consciousness she has had a headache since then. She has history of an aneurysm with clipping in 2016 at [facility] she is awake alert and oriented. She did have some numbness in her face which is typical for her. We will obtain labs CTA head neck, pain control, reassessment.       Critical Care  None          Cierra Hsu MD    Attending Emergency Medicine Physician              Waqar Shoemaker MD  06/26/22 [id number]

## 2022-06-28 LAB
EKG ATRIAL RATE: 87 BPM
EKG P AXIS: 43 DEGREES
EKG P-R INTERVAL: 154 MS
EKG Q-T INTERVAL: 366 MS
EKG QRS DURATION: 82 MS
EKG QTC CALCULATION (BAZETT): 440 MS
EKG R AXIS: 38 DEGREES
EKG T AXIS: 33 DEGREES
EKG VENTRICULAR RATE: 87 BPM

## 2022-06-28 PROCEDURE — 93010 ELECTROCARDIOGRAM REPORT: CPT | Performed by: INTERNAL MEDICINE

## 2023-02-01 ENCOUNTER — APPOINTMENT (OUTPATIENT)
Dept: GENERAL RADIOLOGY | Age: 43
End: 2023-02-01
Payer: COMMERCIAL

## 2023-02-01 ENCOUNTER — HOSPITAL ENCOUNTER (EMERGENCY)
Age: 43
Discharge: HOME | End: 2023-02-01
Attending: STUDENT IN AN ORGANIZED HEALTH CARE EDUCATION/TRAINING PROGRAM
Payer: COMMERCIAL

## 2023-02-01 VITALS
TEMPERATURE: 97.8 F | DIASTOLIC BLOOD PRESSURE: 70 MMHG | OXYGEN SATURATION: 98 % | SYSTOLIC BLOOD PRESSURE: 102 MMHG | RESPIRATION RATE: 15 BRPM | HEART RATE: 73 BPM | BODY MASS INDEX: 28.17 KG/M2 | HEIGHT: 64 IN | WEIGHT: 165 LBS

## 2023-02-01 DIAGNOSIS — F41.1 ANXIETY STATE: ICD-10-CM

## 2023-02-01 DIAGNOSIS — R07.9 CHEST PAIN, UNSPECIFIED TYPE: Primary | ICD-10-CM

## 2023-02-01 LAB
ABSOLUTE EOS #: 0.1 K/UL (ref 0–0.4)
ABSOLUTE LYMPH #: 1.9 K/UL (ref 1–4.8)
ABSOLUTE MONO #: 0.4 K/UL (ref 0.1–1.3)
ANION GAP SERPL CALCULATED.3IONS-SCNC: 10 MMOL/L (ref 9–17)
BASOPHILS # BLD: 1 % (ref 0–2)
BASOPHILS ABSOLUTE: 0.1 K/UL (ref 0–0.2)
BUN SERPL-MCNC: 10 MG/DL (ref 6–20)
CALCIUM SERPL-MCNC: 9.3 MG/DL (ref 8.6–10.4)
CHLORIDE SERPL-SCNC: 103 MMOL/L (ref 98–107)
CO2 SERPL-SCNC: 25 MMOL/L (ref 20–31)
CREAT SERPL-MCNC: 0.74 MG/DL (ref 0.5–0.9)
EOSINOPHILS RELATIVE PERCENT: 1 % (ref 0–4)
GFR SERPL CREATININE-BSD FRML MDRD: >60 ML/MIN/1.73M2
GLUCOSE SERPL-MCNC: 111 MG/DL (ref 70–99)
HCT VFR BLD AUTO: 41.6 % (ref 36–46)
HGB BLD-MCNC: 14.1 G/DL (ref 12–16)
LYMPHOCYTES # BLD: 23 % (ref 24–44)
MAGNESIUM SERPL-MCNC: 2.2 MG/DL (ref 1.6–2.6)
MCH RBC QN AUTO: 31.1 PG (ref 26–34)
MCHC RBC AUTO-ENTMCNC: 33.8 G/DL (ref 31–37)
MCV RBC AUTO: 92 FL (ref 80–100)
MONOCYTES # BLD: 5 % (ref 1–7)
PDW BLD-RTO: 12.9 % (ref 11.5–14.9)
PLATELET # BLD AUTO: 373 K/UL (ref 150–450)
PMV BLD AUTO: 7.3 FL (ref 6–12)
POTASSIUM SERPL-SCNC: 4.1 MMOL/L (ref 3.7–5.3)
RBC # BLD: 4.53 M/UL (ref 4–5.2)
SEG NEUTROPHILS: 70 % (ref 36–66)
SEGMENTED NEUTROPHILS ABSOLUTE COUNT: 5.9 K/UL (ref 1.3–9.1)
SODIUM SERPL-SCNC: 138 MMOL/L (ref 135–144)
TROPONIN I SERPL DL<=0.01 NG/ML-MCNC: <6 NG/L (ref 0–14)
WBC # BLD AUTO: 8.3 K/UL (ref 3.5–11)

## 2023-02-01 PROCEDURE — 84484 ASSAY OF TROPONIN QUANT: CPT

## 2023-02-01 PROCEDURE — 71046 X-RAY EXAM CHEST 2 VIEWS: CPT

## 2023-02-01 PROCEDURE — 80048 BASIC METABOLIC PNL TOTAL CA: CPT

## 2023-02-01 PROCEDURE — 93005 ELECTROCARDIOGRAM TRACING: CPT | Performed by: STUDENT IN AN ORGANIZED HEALTH CARE EDUCATION/TRAINING PROGRAM

## 2023-02-01 PROCEDURE — 85025 COMPLETE CBC W/AUTO DIFF WBC: CPT

## 2023-02-01 PROCEDURE — 36415 COLL VENOUS BLD VENIPUNCTURE: CPT

## 2023-02-01 PROCEDURE — 99285 EMERGENCY DEPT VISIT HI MDM: CPT

## 2023-02-01 PROCEDURE — 83735 ASSAY OF MAGNESIUM: CPT

## 2023-02-01 RX ORDER — LORAZEPAM 1 MG/1
1 TABLET ORAL EVERY 8 HOURS PRN
Qty: 8 TABLET | Refills: 0 | Status: SHIPPED | OUTPATIENT
Start: 2023-02-01 | End: 2023-02-22

## 2023-02-01 ASSESSMENT — ENCOUNTER SYMPTOMS
SHORTNESS OF BREATH: 0
ABDOMINAL PAIN: 0
RHINORRHEA: 0
COUGH: 0
NAUSEA: 0
VOMITING: 0

## 2023-02-01 ASSESSMENT — LIFESTYLE VARIABLES
HOW OFTEN DO YOU HAVE A DRINK CONTAINING ALCOHOL: NEVER
HOW MANY STANDARD DRINKS CONTAINING ALCOHOL DO YOU HAVE ON A TYPICAL DAY: PATIENT DOES NOT DRINK

## 2023-02-01 NOTE — DISCHARGE INSTRUCTIONS
Please follow-up with [facility] at your scheduled appointment  Please call your primary care provider as well  Should you have any further altercations, please return to the emergency room immediately  If you have any return of your chest pain, please return to the emergency room immediately
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
## 2023-02-02 LAB
EKG ATRIAL RATE: 78 BPM
EKG P AXIS: 53 DEGREES
EKG P-R INTERVAL: 150 MS
EKG Q-T INTERVAL: 404 MS
EKG QRS DURATION: 80 MS
EKG QTC CALCULATION (BAZETT): 460 MS
EKG R AXIS: 27 DEGREES
EKG T AXIS: 20 DEGREES
EKG VENTRICULAR RATE: 78 BPM

## 2023-02-02 PROCEDURE — 93010 ELECTROCARDIOGRAM REPORT: CPT | Performed by: INTERNAL MEDICINE

## 2023-02-15 ENCOUNTER — HOSPITAL ENCOUNTER (EMERGENCY)
Age: 43
Discharge: HOME | End: 2023-02-15
Attending: EMERGENCY MEDICINE
Payer: COMMERCIAL

## 2023-02-15 VITALS
RESPIRATION RATE: 16 BRPM | SYSTOLIC BLOOD PRESSURE: 123 MMHG | BODY MASS INDEX: 28.68 KG/M2 | OXYGEN SATURATION: 97 % | DIASTOLIC BLOOD PRESSURE: 81 MMHG | HEIGHT: 64 IN | HEART RATE: 104 BPM | TEMPERATURE: 98.3 F | WEIGHT: 168 LBS

## 2023-02-15 DIAGNOSIS — Z02.89 ENCOUNTER TO OBTAIN EXCUSE FROM WORK: Primary | ICD-10-CM

## 2023-02-15 PROCEDURE — 99282 EMERGENCY DEPT VISIT SF MDM: CPT

## 2023-02-15 NOTE — ED PROVIDER NOTES
[address]  eMERGENCY dEPARTMENT eNCOUnter   Independent Attestation     Pt Name: Radha Tovar  MRN: [id number]  [place] 1980  Date of evaluation: 2/15/23       Radha Tovar is a 43 y.o. female who presents with Letter for School/Work        Based on the medical record, the care appears appropriate. I was personally available for consultation in the Emergency Department.     Carlee Bauer MD  Attending Emergency  Physician               Carlee Bauer MD  02/15/23 5428

## 2023-02-15 NOTE — Clinical Note
Yoselin Mtz was seen and treated in our emergency department on 2/15/2023. She may return to work on 02/17/2023. If you have any questions or concerns, please don't hesitate to call.       Pricilla Dudley MD

## 2023-02-15 NOTE — ED NOTES
Mode of arrival (squad #, walk in, police, etc) : walk in        Chief complaint(s): Work note        Arrival Note (brief scenario, treatment PTA, etc). : Pt states she was seen here on the 1st and has not returned to work. Pt states she just needs a new work note by an MD.         Sourav Ordaz you ever felt that you should Cut down on your drinking? \"  No  A= \"Have people Annoyed you by criticizing your drinking? \"  No  G= \"Have you ever felt bad or Guilty about your drinking? \"  No  E= \"Have you ever had a drink as an Eye-opener first thing in the morning to steady your nerves or to help a hangover? \"  No      Deferred []      Reason for deferring: N/A    *If yes to two or more: probable alcohol abuse. *       Mabel Gibson RN  02/15/23 7750

## 2023-02-15 NOTE — ED PROVIDER NOTES
[address] [facility] ED  eMERGENCY dEPARTMENT eNCOUnter      Pt Name: Ashlee Brito  MRN: [id number]  [place] 1980  Date of evaluation: 2/15/2023  Provider: Luana Gutierrez PA-C    CHIEF COMPLAINT       Chief Complaint   Patient presents with    Letter for School/Work           HISTORY OF PRESENT ILLNESS  (Location/Symptom, Timing/Onset, Context/Setting, Quality, Duration, Modifying Factors, Severity.)   Ashlee Brito is a 43 y.o. female who presents to the emergency department for work note. Pt states she was seen in our ED on  for panic attack. Pt was given an return to work note for . Pt states she was taking the ativan and followed up with [facility] intake. She was unable to see a psychiatrist that day and does not see them again until march. Pt misunderstood the instructions and took 2 weeks off of work. Her employer is now requiring a note that showed patient was off for the past 2 weeks. She has no other complaints. Nursing Notes were reviewed. REVIEW OF SYSTEMS    (2-9 systems for level 4, 10 or more for level 5)     Review of Systems   Work note     Except as noted above the remainder of the review of systems was reviewed and negative.        PAST MEDICAL HISTORY     Past Medical History:   Diagnosis Date    Anxiety     Anxiety and depression     Cerebral aneurysm     Chronic left-sided low back pain     Chronic neck pain     Depression     Gallstones     Headache     PTSD (post-traumatic stress disorder)      None otherwise stated in nurses notes    SURGICAL HISTORY       Past Surgical History:   Procedure Laterality Date    BRAIN ANEURYSM SURGERY      BRAIN SURGERY  2016    mizuho titanium aneurysm clip safe 3T per documentation, done at [facility] 2016     SECTION      x4    [address] [facility] [address]       None otherwise stated in nurses notes    CURRENT MEDICATIONS       Discharge Medication List as of 2/15/2023  3:06 PM        CONTINUE these medications which have NOT CHANGED    Details   LORazepam (ATIVAN) 1 MG tablet Take 1 tablet by mouth every 8 hours as needed for Anxiety for up to 8 doses. Max Daily Amount: 3 mg, Disp-8 tablet, R-0Print      acetaminophen (TYLENOL) 325 MG tablet Take 2 tablets by mouth every 6 hours as needed for Pain, Disp-120 tablet, R-0Print      lidocaine 4 % external patch Place 1 patch onto the skin daily, Transdermal, DAILY Starting Fri 4/30/2021, Disp-5 patch, R-0, Print      busPIRone (BUSPAR) 10 MG tablet Take 1 tablet by mouth 3 times daily, Disp-90 tablet,R-0Normal      metFORMIN (GLUCOPHAGE) 500 MG tablet Take 1 tablet by mouth 2 times daily (with meals), Disp-60 tablet,R-3Normal      ondansetron (ZOFRAN) 4 MG tablet Take 1 tablet by mouth every 8 hours as needed for Nausea, Disp-20 tablet,R-0Normal      QUEtiapine (SEROQUEL) 50 MG tablet Take 3 tablets by mouth nightly, Disp-60 tablet,R-3Normal      vitamin D (CHOLECALCIFEROL) 1000 UNIT TABS tablet Take 1 tablet by mouth daily, Disp-60 tablet,R-0Normal      Norgestim-Eth Estrad Triphasic (ORTHO TRI-CYCLEN, 28,) 0.18/0.215/0.25 MG-35 MCG TABS Take 1 tablet by mouth dailyHistorical Med             ALLERGIES     Doxycycline, Hydroxyzine hcl, Prednisone, and Penicillins    FAMILY HISTORY           Problem Relation Age of Onset    Cancer Father     Stroke Father      Family Status   Relation Name Status    Father  (Not Specified)      None otherwise stated in nurses notes    SOCIAL HISTORY      reports that she quit smoking about 7 years ago. Her smoking use included cigarettes. She has never used smokeless tobacco. She reports current alcohol use. She reports that she does not use drugs.    lives at home with others     PHYSICAL EXAM    (up to 7 for level 4, 8 or more for level 5)     ED Triage Vitals [02/15/23 1439]   BP Temp Temp src Heart Rate Resp SpO2 Height Weight   123/81 98.3 °F (36.8 °C) -- (!) 104 16 97 % 5' 4\" (1.626 m) 168 lb (76.2 kg)       Physical Exam Nursing note and vitals reviewed. Constitutional: well-developed, well-nourished, nontoxic, well appearing, not distressed  HEENT:  normocephalic atraumatic, external ears normal appearance, no nasal deformity, no neck masses or edema, patient protecting airway, no stridor, phonating well  Eyes: pupils equal, sclera non-icteric, no discharge  Cardiovascular: no JVD  Respiratory: non-labored breathing, effort normal, no accessory muscle use visulized, no audible wheezing  Gastrointestinal: Abdomen not distended  Musculoskeletal: moves extremities without impaired range of motion, no deformity, no edema  Skin: no pallor, no rashes visible  Neuro: alert and oriented times 3, GCS 15, normal coordination, no dysarthria or aphasia  Psych: normal mood and affect, cooperative, normal thought content            DIAGNOSTIC RESULTS     EKG: All EKG's are interpreted by the Emergency Department Physician who either signs or Co-signs this chart in the absence of a cardiologist.        RADIOLOGY:   All plain film, CT, MRI, and formal ultrasound images (except ED bedside ultrasound) are read by the radiologist, see reports below, unless otherwise noted in MDM or here. No orders to display       XR CHEST (2 VW)    Result Date: 2/2/2023  EXAMINATION: TWO X-RAY VIEWS OF THE CHEST 2/1/2023 8:17 am COMPARISON: 10/21/2021 HISTORY: ORDERING SYSTEM PROVIDED HISTORY:  Chest pain TECHNOLOGIST PROVIDED HISTORY: Chest pain Reason for Exam:  Chest pain FINDINGS: The mediastinal and cardiac contours are normal.  The lungs are clear. There is no focal consolidation, pleural effusion or pneumothorax evident. The bones are unremarkable. No acute cardiopulmonary process identified. LABS:  Labs Reviewed - No data to display    All other labs were within normal range or not returned as of this dictation.     EMERGENCY DEPARTMENT COURSE and DIFFERENTIAL DIAGNOSIS/MDM:   Vitals:    Vitals:    02/15/23 1439   BP: 123/81   Pulse: (!) 104   Resp: 16   Temp: 98.3 °F (36.8 °C)   SpO2: 97%   Weight: 168 lb (76.2 kg)   Height: 5' 4\" (1.626 m)         Patient instructed to return to the emergency room if symptoms worsen, return, or any other concern right away which is agreed by the patient    ED MEDS:  No orders of the defined types were placed in this encounter. CONSULTS:  None    PROCEDURES:  None      FINAL IMPRESSION      1. Encounter to obtain excuse from work          DISPOSITION/PLAN   DISPOSITION Decision To Discharge    PATIENT REFERRED TO:  LISA Chavez CNP. [address]  [facility] [address]  [facility] [address]  [phone]        DISCHARGE MEDICATIONS:  Discharge Medication List as of 2/15/2023  3:06 PM            Summation      Patient Course:    Pt needs a return to work note. Needs it to cover her for the past 2 weeks. She is requiring a note from an MD.   I did speak with Dr. Evi Jones who states he is unable to back date work notes. He will give her a note to return to work on Friday. The care is provided during an unprecedented national emergency due to the novel coronavirus, COVID-19. ED Medications administered this visit:  Medications - No data to display    New Prescriptions from this visit:    Discharge Medication List as of 2/15/2023  3:06 PM          Follow-up:  LISA Chavez CNP. [address]  [address]  [address]  [address]  [place] [address]  [phone]          Final Impression:   1.  Encounter to obtain excuse from work               (Please note that portions of this note were completed with a voice recognition program )        RAJ Linder PA-C  02/15/23 1758

## 2023-02-15 NOTE — Clinical Note
Radha Maravilla was seen and treated in our emergency department on 2/15/2023. She may return to work on 02/17/2023. If you have any questions or concerns, please don't hesitate to call.       Angel Currie MD

## 2024-02-25 ENCOUNTER — APPOINTMENT (OUTPATIENT)
Dept: CT IMAGING | Age: 44
End: 2024-02-25
Payer: COMMERCIAL

## 2024-02-25 ENCOUNTER — HOSPITAL ENCOUNTER (EMERGENCY)
Age: 44
Discharge: HOME | End: 2024-02-26
Attending: EMERGENCY MEDICINE
Payer: COMMERCIAL

## 2024-02-25 VITALS
OXYGEN SATURATION: 99 % | BODY MASS INDEX: 28.15 KG/M2 | TEMPERATURE: 97 F | WEIGHT: 164.02 LBS | RESPIRATION RATE: 18 BRPM | SYSTOLIC BLOOD PRESSURE: 132 MMHG | HEART RATE: 67 BPM | DIASTOLIC BLOOD PRESSURE: 91 MMHG
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
## 2024-02-25 LAB
ANION GAP SERPL CALCULATED.3IONS-SCNC: 9 MMOL/L (ref 9–17)
BUN SERPL-MCNC: 9 MG/DL (ref 6–20)
CALCIUM SERPL-MCNC: 8.7 MG/DL (ref 8.6–10.4)
CHLORIDE SERPL-SCNC: 103 MMOL/L (ref 98–107)
CO2 SERPL-SCNC: 24 MMOL/L (ref 20–31)
CREAT SERPL-MCNC: 0.8 MG/DL (ref 0.5–0.9)
GFR SERPL CREATININE-BSD FRML MDRD: >60 ML/MIN/1.73M2
GLUCOSE SERPL-MCNC: 121 MG/DL (ref 70–99)
POTASSIUM SERPL-SCNC: 3.7 MMOL/L (ref 3.7–5.3)
SODIUM SERPL-SCNC: 136 MMOL/L (ref 135–144)

## 2024-02-25 PROCEDURE — 6370000000 HC RX 637 (ALT 250 FOR IP): Performed by: STUDENT IN AN ORGANIZED HEALTH CARE EDUCATION/TRAINING PROGRAM

## 2024-02-25 PROCEDURE — 70450 CT HEAD/BRAIN W/O DYE: CPT

## 2024-02-25 PROCEDURE — 70498 CT ANGIOGRAPHY NECK: CPT

## 2024-02-25 PROCEDURE — 96372 THER/PROPH/DIAG INJ SC/IM: CPT

## 2024-02-25 PROCEDURE — 80048 BASIC METABOLIC PNL TOTAL CA: CPT

## 2024-02-25 PROCEDURE — 99285 EMERGENCY DEPT VISIT HI MDM: CPT

## 2024-02-25 PROCEDURE — 6360000004 HC RX CONTRAST MEDICATION: Performed by: STUDENT IN AN ORGANIZED HEALTH CARE EDUCATION/TRAINING PROGRAM

## 2024-02-25 RX ORDER — LORAZEPAM 0.5 MG/1
0.5 TABLET ORAL ONCE
Status: COMPLETED | OUTPATIENT
Start: 2024-02-25 | End: 2024-02-25

## 2024-02-25 RX ORDER — ACETAMINOPHEN 325 MG/1
650 TABLET ORAL ONCE
Status: COMPLETED | OUTPATIENT
Start: 2024-02-25 | End: 2024-02-25

## 2024-02-25 RX ADMIN — LORAZEPAM 0.5 MG: 0.5 TABLET ORAL at 23:24

## 2024-02-25 RX ADMIN — ACETAMINOPHEN 650 MG: 325 TABLET ORAL at 23:24

## 2024-02-25 RX ADMIN — IOPAMIDOL 90 ML: 755 INJECTION, SOLUTION INTRAVENOUS at 23:59

## 2024-02-25 ASSESSMENT — PAIN - FUNCTIONAL ASSESSMENT: PAIN_FUNCTIONAL_ASSESSMENT: 0-10

## 2024-02-25 ASSESSMENT — PAIN SCALES - GENERAL: PAINLEVEL_OUTOF10: 10

## 2024-02-26 PROCEDURE — 6360000002 HC RX W HCPCS: Performed by: STUDENT IN AN ORGANIZED HEALTH CARE EDUCATION/TRAINING PROGRAM

## 2024-02-26 RX ORDER — KETOROLAC TROMETHAMINE 30 MG/ML
30 INJECTION, SOLUTION INTRAMUSCULAR; INTRAVENOUS ONCE
Status: COMPLETED | OUTPATIENT
Start: 2024-02-26 | End: 2024-02-26

## 2024-02-26 RX ORDER — CYCLOBENZAPRINE HCL 10 MG
10 TABLET ORAL 3 TIMES DAILY PRN
Qty: 21 TABLET | Refills: 0 | Status: SHIPPED | OUTPATIENT
Start: 2024-02-26 | End: 2024-03-07

## 2024-02-26 RX ADMIN — KETOROLAC TROMETHAMINE 30 MG: 30 INJECTION, SOLUTION INTRAMUSCULAR; INTRAVENOUS at 02:08
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
## 2024-03-04 ASSESSMENT — ENCOUNTER SYMPTOMS
CONSTIPATION: 0
NAUSEA: 0
VOMITING: 0
SORE THROAT: 0
BACK PAIN: 0
ABDOMINAL PAIN: 0
DIARRHEA: 0
SHORTNESS OF BREATH: 0
SINUS PRESSURE: 0
WHEEZING: 0

## 2024-06-11 ENCOUNTER — HOSPITAL ENCOUNTER (INPATIENT)
Age: 44
LOS: 1 days | Discharge: HOME | DRG: 565 | End: 2024-06-13
Attending: EMERGENCY MEDICINE | Admitting: SURGERY
Payer: COMMERCIAL

## 2024-06-11 ENCOUNTER — APPOINTMENT (OUTPATIENT)
Dept: CT IMAGING | Age: 44
DRG: 565 | End: 2024-06-11
Payer: COMMERCIAL

## 2024-06-11 ENCOUNTER — APPOINTMENT (OUTPATIENT)
Dept: MRI IMAGING | Age: 44
DRG: 565 | End: 2024-06-11
Payer: COMMERCIAL

## 2024-06-11 ENCOUNTER — APPOINTMENT (OUTPATIENT)
Dept: GENERAL RADIOLOGY | Age: 44
DRG: 565 | End: 2024-06-11
Payer: COMMERCIAL
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
## 2024-06-11 DIAGNOSIS — S22.22XA FRACTURE OF BODY OF STERNUM, INITIAL ENCOUNTER FOR CLOSED FRACTURE: ICD-10-CM

## 2024-06-11 DIAGNOSIS — S22.22XA CLOSED FRACTURE OF BODY OF STERNUM, INITIAL ENCOUNTER: ICD-10-CM

## 2024-06-11 DIAGNOSIS — S22.018A OTHER CLOSED FRACTURE OF FIRST THORACIC VERTEBRA, INITIAL ENCOUNTER (HCC): ICD-10-CM

## 2024-06-11 LAB
ANION GAP SERPL CALCULATED.3IONS-SCNC: 12 MMOL/L (ref 9–16)
BASOPHILS # BLD: 0.04 K/UL (ref 0–0.2)
BASOPHILS NFR BLD: 0 % (ref 0–2)
BUN SERPL-MCNC: 20 MG/DL (ref 6–20)
CALCIUM SERPL-MCNC: 8.7 MG/DL (ref 8.6–10.4)
CHLORIDE SERPL-SCNC: 100 MMOL/L (ref 98–107)
CO2 SERPL-SCNC: 25 MMOL/L (ref 20–31)
CREAT SERPL-MCNC: 0.9 MG/DL (ref 0.5–0.9)
EOSINOPHIL # BLD: <0.03 K/UL (ref 0–0.44)
EOSINOPHILS RELATIVE PERCENT: 0 % (ref 1–4)
ERYTHROCYTE [DISTWIDTH] IN BLOOD BY AUTOMATED COUNT: 12.8 % (ref 11.8–14.4)
GFR, ESTIMATED: 77 ML/MIN/1.73M2
GLUCOSE SERPL-MCNC: 96 MG/DL (ref 74–99)
HCG SERPL QL: NEGATIVE
HCT VFR BLD AUTO: 40 % (ref 36.3–47.1)
HGB BLD-MCNC: 13.6 G/DL (ref 11.9–15.1)
IMM GRANULOCYTES # BLD AUTO: 0.34 K/UL (ref 0–0.3)
IMM GRANULOCYTES NFR BLD: 2 %
LYMPHOCYTES NFR BLD: 2.38 K/UL (ref 1.1–3.7)
LYMPHOCYTES RELATIVE PERCENT: 17 % (ref 24–43)
MCH RBC QN AUTO: 31.5 PG (ref 25.2–33.5)
MCHC RBC AUTO-ENTMCNC: 34 G/DL (ref 28.4–34.8)
MCV RBC AUTO: 92.6 FL (ref 82.6–102.9)
MONOCYTES NFR BLD: 1 K/UL (ref 0.1–1.2)
MONOCYTES NFR BLD: 7 % (ref 3–12)
NEUTROPHILS NFR BLD: 74 % (ref 36–65)
NEUTS SEG NFR BLD: 10.46 K/UL (ref 1.5–8.1)
NRBC BLD-RTO: 0 PER 100 WBC
PLATELET # BLD AUTO: 344 K/UL (ref 138–453)
PMV BLD AUTO: 9.2 FL (ref 8.1–13.5)
POTASSIUM SERPL-SCNC: 4 MMOL/L (ref 3.7–5.3)
RBC # BLD AUTO: 4.32 M/UL (ref 3.95–5.11)
SODIUM SERPL-SCNC: 137 MMOL/L (ref 136–145)
TROPONIN I SERPL HS-MCNC: <6 NG/L (ref 0–14)
TROPONIN I SERPL HS-MCNC: <6 NG/L (ref 0–14)
WBC OTHER # BLD: 14.2 K/UL (ref 3.5–11.3)

## 2024-06-11 PROCEDURE — 96372 THER/PROPH/DIAG INJ SC/IM: CPT

## 2024-06-11 PROCEDURE — 6360000002 HC RX W HCPCS: Performed by: STUDENT IN AN ORGANIZED HEALTH CARE EDUCATION/TRAINING PROGRAM

## 2024-06-11 PROCEDURE — 96375 TX/PRO/DX INJ NEW DRUG ADDON: CPT

## 2024-06-11 PROCEDURE — 99285 EMERGENCY DEPT VISIT HI MDM: CPT

## 2024-06-11 PROCEDURE — 72141 MRI NECK SPINE W/O DYE: CPT

## 2024-06-11 PROCEDURE — 84703 CHORIONIC GONADOTROPIN ASSAY: CPT

## 2024-06-11 PROCEDURE — 96376 TX/PRO/DX INJ SAME DRUG ADON: CPT

## 2024-06-11 PROCEDURE — 93005 ELECTROCARDIOGRAM TRACING: CPT | Performed by: SURGERY

## 2024-06-11 PROCEDURE — 96374 THER/PROPH/DIAG INJ IV PUSH: CPT

## 2024-06-11 PROCEDURE — 72125 CT NECK SPINE W/O DYE: CPT

## 2024-06-11 PROCEDURE — 73030 X-RAY EXAM OF SHOULDER: CPT

## 2024-06-11 PROCEDURE — 84484 ASSAY OF TROPONIN QUANT: CPT

## 2024-06-11 PROCEDURE — 80048 BASIC METABOLIC PNL TOTAL CA: CPT

## 2024-06-11 PROCEDURE — 71250 CT THORAX DX C-: CPT

## 2024-06-11 PROCEDURE — 85025 COMPLETE CBC W/AUTO DIFF WBC: CPT

## 2024-06-11 PROCEDURE — 6360000002 HC RX W HCPCS

## 2024-06-11 PROCEDURE — 74177 CT ABD & PELVIS W/CONTRAST: CPT

## 2024-06-11 PROCEDURE — 70450 CT HEAD/BRAIN W/O DYE: CPT

## 2024-06-11 PROCEDURE — 73130 X-RAY EXAM OF HAND: CPT

## 2024-06-11 RX ORDER — FENTANYL CITRATE 50 UG/ML
50 INJECTION, SOLUTION INTRAMUSCULAR; INTRAVENOUS ONCE
Status: COMPLETED | OUTPATIENT
Start: 2024-06-11 | End: 2024-06-11

## 2024-06-11 RX ORDER — ORPHENADRINE CITRATE 30 MG/ML
60 INJECTION INTRAMUSCULAR; INTRAVENOUS ONCE
Status: COMPLETED | OUTPATIENT
Start: 2024-06-11 | End: 2024-06-11

## 2024-06-11 RX ORDER — ONDANSETRON 2 MG/ML
4 INJECTION INTRAMUSCULAR; INTRAVENOUS ONCE
Status: COMPLETED | OUTPATIENT
Start: 2024-06-11 | End: 2024-06-11

## 2024-06-11 RX ADMIN — FENTANYL CITRATE 50 MCG: 50 INJECTION, SOLUTION INTRAMUSCULAR; INTRAVENOUS at 18:05

## 2024-06-11 RX ADMIN — ONDANSETRON 4 MG: 2 INJECTION INTRAMUSCULAR; INTRAVENOUS at 18:04

## 2024-06-11 RX ADMIN — FENTANYL CITRATE 50 MCG: 50 INJECTION, SOLUTION INTRAMUSCULAR; INTRAVENOUS at 21:53

## 2024-06-11 RX ADMIN — FENTANYL CITRATE 50 MCG: 50 INJECTION, SOLUTION INTRAMUSCULAR; INTRAVENOUS at 16:00

## 2024-06-11 RX ADMIN — ORPHENADRINE CITRATE 60 MG: 60 INJECTION INTRAMUSCULAR; INTRAVENOUS at 18:06

## 2024-06-11 ASSESSMENT — ENCOUNTER SYMPTOMS
ABDOMINAL PAIN: 1
NAUSEA: 0
ABDOMINAL PAIN: 0
BACK PAIN: 0
VOMITING: 0
BACK PAIN: 1
CHEST TIGHTNESS: 1
SHORTNESS OF BREATH: 0

## 2024-06-11 ASSESSMENT — PAIN DESCRIPTION - LOCATION
LOCATION: CHEST;NECK;BACK
LOCATION: NECK;CHEST;SHOULDER

## 2024-06-11 ASSESSMENT — PAIN SCALES - GENERAL
PAINLEVEL_OUTOF10: 10
PAINLEVEL_OUTOF10: 10

## 2024-06-11 ASSESSMENT — PAIN DESCRIPTION - DESCRIPTORS
DESCRIPTORS: ACHING
DESCRIPTORS: ACHING

## 2024-06-11 ASSESSMENT — PAIN DESCRIPTION - ORIENTATION
ORIENTATION: RIGHT
ORIENTATION: MID

## 2024-06-11 ASSESSMENT — PAIN - FUNCTIONAL ASSESSMENT: PAIN_FUNCTIONAL_ASSESSMENT: 0-10

## 2024-06-11 NOTE — H&P
as pain in her right side of abdomen. She has history of open bypass of aneurysm in brain, is now complaining of a headache as well.     Traumatic loss of Consciousness: No    Total Fluids Given Prior To Arrival  mL    MEDICATIONS:   []  None     []  Information not available due to exam limitations documented above    Prior to Admission medications    Medication Sig Start Date End Date Taking? Authorizing Provider   acetaminophen (TYLENOL) 325 MG tablet Take 2 tablets by mouth every 6 hours as needed for Pain 22   Melissa Santana,    lidocaine 4 % external patch Place 1 patch onto the skin daily 21   Costa Szymanski MD   busPIRone (BUSPAR) 10 MG tablet Take 1 tablet by mouth 3 times daily 20   Dick Zacarias MD   metFORMIN (GLUCOPHAGE) 500 MG tablet Take 1 tablet by mouth 2 times daily (with meals) 20   Dick Zacarias MD   ondansetron (ZOFRAN) 4 MG tablet Take 1 tablet by mouth every 8 hours as needed for Nausea 20   Dick Zacarias MD   QUEtiapine (SEROQUEL) 50 MG tablet Take 3 tablets by mouth nightly 20   Dick Zacarias MD   vitamin D (CHOLECALCIFEROL) 1000 UNIT TABS tablet Take 1 tablet by mouth daily 20   Dick Zacarias MD   Norgestim-Eth Estrad Triphasic (ORTHO TRI-CYCLEN, 28,) 0.18/0.215/0.25 MG-35 MCG TABS Take 1 tablet by mouth daily    ProviderKimberly MD       ALLERGIES:   []  None    []   Information not available due to exam limitations documented above     Doxycycline, Hydroxyzine hcl, Prednisone, and Penicillins    PAST MEDICAL/SURGICAL HISTORY: []  None   []   Information not available due to exam limitations documented above      has a past medical history of Anxiety, Anxiety and depression, Cerebral aneurysm, Chronic left-sided low back pain, Chronic neck pain, Depression, Gallstones, Headache, and PTSD (post-traumatic stress disorder).  has a past surgical history that includes  section; brain surgery (2016); Brain aneurysm surgery; and
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
## 2024-06-12 ENCOUNTER — APPOINTMENT (OUTPATIENT)
Dept: GENERAL RADIOLOGY | Age: 44
DRG: 565 | End: 2024-06-12
Payer: COMMERCIAL

## 2024-06-12 PROBLEM — S22.22XA FRACTURE OF BODY OF STERNUM, INITIAL ENCOUNTER FOR CLOSED FRACTURE: Status: ACTIVE | Noted: 2024-06-12

## 2024-06-12 LAB
BASOPHILS # BLD: <0.03 K/UL (ref 0–0.2)
BASOPHILS NFR BLD: 0 % (ref 0–2)
BUN SERPL-MCNC: 16 MG/DL (ref 6–20)
CALCIUM SERPL-MCNC: 8.5 MG/DL (ref 8.6–10.4)
CHLORIDE SERPL-SCNC: 102 MMOL/L (ref 98–107)
CO2 SERPL-SCNC: 24 MMOL/L (ref 20–31)
CREAT SERPL-MCNC: 0.8 MG/DL (ref 0.5–0.9)
EOSINOPHIL # BLD: <0.03 K/UL (ref 0–0.44)
EOSINOPHILS RELATIVE PERCENT: 0 % (ref 1–4)
ERYTHROCYTE [DISTWIDTH] IN BLOOD BY AUTOMATED COUNT: 13.1 % (ref 11.8–14.4)
GFR, ESTIMATED: >90 ML/MIN/1.73M2
GLUCOSE SERPL-MCNC: 101 MG/DL (ref 74–99)
HCT VFR BLD AUTO: 39.1 % (ref 36.3–47.1)
HGB BLD-MCNC: 13 G/DL (ref 11.9–15.1)
IMM GRANULOCYTES # BLD AUTO: 0.15 K/UL (ref 0–0.3)
IMM GRANULOCYTES NFR BLD: 1 %
LYMPHOCYTES NFR BLD: 2.07 K/UL (ref 1.1–3.7)
LYMPHOCYTES RELATIVE PERCENT: 17 % (ref 24–43)
MCH RBC QN AUTO: 31.1 PG (ref 25.2–33.5)
MCHC RBC AUTO-ENTMCNC: 33.2 G/DL (ref 28.4–34.8)
MCV RBC AUTO: 93.5 FL (ref 82.6–102.9)
MONOCYTES NFR BLD: 0.87 K/UL (ref 0.1–1.2)
MONOCYTES NFR BLD: 7 % (ref 3–12)
NEUTROPHILS NFR BLD: 75 % (ref 36–65)
NEUTS SEG NFR BLD: 8.83 K/UL (ref 1.5–8.1)
NRBC BLD-RTO: 0 PER 100 WBC
PLATELET # BLD AUTO: 304 K/UL (ref 138–453)
PMV BLD AUTO: 9.1 FL (ref 8.1–13.5)
POTASSIUM SERPL-SCNC: 4.5 MMOL/L (ref 3.7–5.3)
RBC # BLD AUTO: 4.18 M/UL (ref 3.95–5.11)
SODIUM SERPL-SCNC: 137 MMOL/L (ref 136–145)
WBC OTHER # BLD: 11.9 K/UL (ref 3.5–11.3)

## 2024-06-12 PROCEDURE — 6360000002 HC RX W HCPCS: Performed by: STUDENT IN AN ORGANIZED HEALTH CARE EDUCATION/TRAINING PROGRAM

## 2024-06-12 PROCEDURE — 6370000000 HC RX 637 (ALT 250 FOR IP)

## 2024-06-12 PROCEDURE — 97165 OT EVAL LOW COMPLEX 30 MIN: CPT

## 2024-06-12 PROCEDURE — 97161 PT EVAL LOW COMPLEX 20 MIN: CPT

## 2024-06-12 PROCEDURE — 93005 ELECTROCARDIOGRAM TRACING: CPT | Performed by: SURGERY

## 2024-06-12 PROCEDURE — 80048 BASIC METABOLIC PNL TOTAL CA: CPT

## 2024-06-12 PROCEDURE — 97535 SELF CARE MNGMENT TRAINING: CPT

## 2024-06-12 PROCEDURE — 72040 X-RAY EXAM NECK SPINE 2-3 VW: CPT

## 2024-06-12 PROCEDURE — 2580000003 HC RX 258

## 2024-06-12 PROCEDURE — 73030 X-RAY EXAM OF SHOULDER: CPT

## 2024-06-12 PROCEDURE — 92523 SPEECH SOUND LANG COMPREHEN: CPT

## 2024-06-12 PROCEDURE — 97116 GAIT TRAINING THERAPY: CPT

## 2024-06-12 PROCEDURE — 6370000000 HC RX 637 (ALT 250 FOR IP): Performed by: STUDENT IN AN ORGANIZED HEALTH CARE EDUCATION/TRAINING PROGRAM

## 2024-06-12 PROCEDURE — 85025 COMPLETE CBC W/AUTO DIFF WBC: CPT

## 2024-06-12 PROCEDURE — 36415 COLL VENOUS BLD VENIPUNCTURE: CPT

## 2024-06-12 PROCEDURE — 1200000000 HC SEMI PRIVATE

## 2024-06-12 PROCEDURE — 6360000004 HC RX CONTRAST MEDICATION

## 2024-06-12 PROCEDURE — 6370000000 HC RX 637 (ALT 250 FOR IP): Performed by: SURGERY

## 2024-06-12 RX ORDER — KETOROLAC TROMETHAMINE 15 MG/ML
15 INJECTION, SOLUTION INTRAMUSCULAR; INTRAVENOUS EVERY 6 HOURS PRN
Status: DISCONTINUED | OUTPATIENT
Start: 2024-06-12 | End: 2024-06-12

## 2024-06-12 RX ORDER — SODIUM CHLORIDE 0.9 % (FLUSH) 0.9 %
5-40 SYRINGE (ML) INJECTION EVERY 12 HOURS SCHEDULED
Status: DISCONTINUED | OUTPATIENT
Start: 2024-06-12 | End: 2024-06-13 | Stop reason: HOSPADM

## 2024-06-12 RX ORDER — KETOROLAC TROMETHAMINE 30 MG/ML
30 INJECTION, SOLUTION INTRAMUSCULAR; INTRAVENOUS EVERY 6 HOURS PRN
Status: DISCONTINUED | OUTPATIENT
Start: 2024-06-12 | End: 2024-06-13 | Stop reason: HOSPADM

## 2024-06-12 RX ORDER — ONDANSETRON 2 MG/ML
4 INJECTION INTRAMUSCULAR; INTRAVENOUS EVERY 6 HOURS PRN
Status: DISCONTINUED | OUTPATIENT
Start: 2024-06-12 | End: 2024-06-13 | Stop reason: HOSPADM

## 2024-06-12 RX ORDER — SODIUM CHLORIDE 9 MG/ML
INJECTION, SOLUTION INTRAVENOUS PRN
Status: DISCONTINUED | OUTPATIENT
Start: 2024-06-12 | End: 2024-06-13 | Stop reason: HOSPADM

## 2024-06-12 RX ORDER — ENOXAPARIN SODIUM 100 MG/ML
30 INJECTION SUBCUTANEOUS 2 TIMES DAILY
Status: DISCONTINUED | OUTPATIENT
Start: 2024-06-12 | End: 2024-06-13 | Stop reason: HOSPADM

## 2024-06-12 RX ORDER — ACETAMINOPHEN 500 MG
1000 TABLET ORAL EVERY 8 HOURS SCHEDULED
Status: DISCONTINUED | OUTPATIENT
Start: 2024-06-12 | End: 2024-06-12

## 2024-06-12 RX ORDER — BUTALBITAL, ACETAMINOPHEN AND CAFFEINE 50; 325; 40 MG/1; MG/1; MG/1
1 TABLET ORAL EVERY 6 HOURS PRN
Status: DISCONTINUED | OUTPATIENT
Start: 2024-06-12 | End: 2024-06-13 | Stop reason: HOSPADM

## 2024-06-12 RX ORDER — IBUPROFEN 600 MG/1
600 TABLET ORAL EVERY 6 HOURS
Status: DISCONTINUED | OUTPATIENT
Start: 2024-06-17 | End: 2024-06-12

## 2024-06-12 RX ORDER — METHOCARBAMOL 750 MG/1
750 TABLET, FILM COATED ORAL EVERY 6 HOURS
Status: DISCONTINUED | OUTPATIENT
Start: 2024-06-12 | End: 2024-06-13 | Stop reason: HOSPADM

## 2024-06-12 RX ORDER — GABAPENTIN 300 MG/1
300 CAPSULE ORAL EVERY 8 HOURS
Status: DISCONTINUED | OUTPATIENT
Start: 2024-06-12 | End: 2024-06-13 | Stop reason: HOSPADM

## 2024-06-12 RX ORDER — POLYETHYLENE GLYCOL 3350 17 G/17G
17 POWDER, FOR SOLUTION ORAL DAILY
Status: DISCONTINUED | OUTPATIENT
Start: 2024-06-12 | End: 2024-06-13 | Stop reason: HOSPADM

## 2024-06-12 RX ORDER — ACETAMINOPHEN 500 MG
1000 TABLET ORAL EVERY 6 HOURS SCHEDULED
Status: DISCONTINUED | OUTPATIENT
Start: 2024-06-12 | End: 2024-06-13

## 2024-06-12 RX ORDER — OXYCODONE HYDROCHLORIDE 5 MG/1
5 TABLET ORAL EVERY 4 HOURS PRN
Status: DISCONTINUED | OUTPATIENT
Start: 2024-06-12 | End: 2024-06-13 | Stop reason: HOSPADM

## 2024-06-12 RX ORDER — ONDANSETRON 4 MG/1
4 TABLET, ORALLY DISINTEGRATING ORAL EVERY 8 HOURS PRN
Status: DISCONTINUED | OUTPATIENT
Start: 2024-06-12 | End: 2024-06-13 | Stop reason: HOSPADM

## 2024-06-12 RX ORDER — ACETAMINOPHEN 500 MG
1000 TABLET ORAL EVERY 6 HOURS
Status: DISCONTINUED | OUTPATIENT
Start: 2024-06-12 | End: 2024-06-12

## 2024-06-12 RX ORDER — OXYCODONE HYDROCHLORIDE 5 MG/1
5 TABLET ORAL EVERY 6 HOURS PRN
Status: DISCONTINUED | OUTPATIENT
Start: 2024-06-12 | End: 2024-06-12

## 2024-06-12 RX ORDER — BUSPIRONE HYDROCHLORIDE 10 MG/1
10 TABLET ORAL 3 TIMES DAILY
Status: CANCELLED | OUTPATIENT
Start: 2024-06-12

## 2024-06-12 RX ORDER — METHOCARBAMOL 750 MG/1
750 TABLET, FILM COATED ORAL 4 TIMES DAILY
Status: DISCONTINUED | OUTPATIENT
Start: 2024-06-12 | End: 2024-06-12

## 2024-06-12 RX ORDER — SODIUM CHLORIDE 0.9 % (FLUSH) 0.9 %
5-40 SYRINGE (ML) INJECTION PRN
Status: DISCONTINUED | OUTPATIENT
Start: 2024-06-12 | End: 2024-06-13 | Stop reason: HOSPADM

## 2024-06-12 RX ADMIN — SODIUM CHLORIDE, PRESERVATIVE FREE 10 ML: 5 INJECTION INTRAVENOUS at 21:30

## 2024-06-12 RX ADMIN — SODIUM CHLORIDE, PRESERVATIVE FREE 10 ML: 5 INJECTION INTRAVENOUS at 10:13

## 2024-06-12 RX ADMIN — GABAPENTIN 300 MG: 300 CAPSULE ORAL at 10:10

## 2024-06-12 RX ADMIN — METHOCARBAMOL 750 MG: 750 TABLET ORAL at 06:49

## 2024-06-12 RX ADMIN — KETOROLAC TROMETHAMINE 15 MG: 15 INJECTION, SOLUTION INTRAMUSCULAR; INTRAVENOUS at 06:48

## 2024-06-12 RX ADMIN — OXYCODONE HYDROCHLORIDE 5 MG: 5 TABLET ORAL at 21:30

## 2024-06-12 RX ADMIN — METHOCARBAMOL 750 MG: 750 TABLET ORAL at 21:29

## 2024-06-12 RX ADMIN — OXYCODONE HYDROCHLORIDE 5 MG: 5 TABLET ORAL at 10:10

## 2024-06-12 RX ADMIN — ACETAMINOPHEN 1000 MG: 500 TABLET ORAL at 02:59

## 2024-06-12 RX ADMIN — OXYCODONE HYDROCHLORIDE 5 MG: 5 TABLET ORAL at 03:00

## 2024-06-12 RX ADMIN — GABAPENTIN 300 MG: 300 CAPSULE ORAL at 18:36

## 2024-06-12 RX ADMIN — ACETAMINOPHEN 1000 MG: 500 TABLET ORAL at 18:35

## 2024-06-12 RX ADMIN — IOPAMIDOL 75 ML: 755 INJECTION, SOLUTION INTRAVENOUS at 00:21

## 2024-06-12 RX ADMIN — METHOCARBAMOL 750 MG: 750 TABLET ORAL at 16:53

## 2024-06-12 RX ADMIN — ACETAMINOPHEN 1000 MG: 500 TABLET ORAL at 12:54

## 2024-06-12 ASSESSMENT — PAIN DESCRIPTION - DESCRIPTORS
DESCRIPTORS: CRUSHING
DESCRIPTORS: CRUSHING
DESCRIPTORS: ACHING;DISCOMFORT
DESCRIPTORS: ACHING;DISCOMFORT
DESCRIPTORS: PRESSURE
DESCRIPTORS: ACHING
DESCRIPTORS: ACHING
DESCRIPTORS: CRUSHING;PRESSURE
DESCRIPTORS: ACHING;DISCOMFORT

## 2024-06-12 ASSESSMENT — PAIN DESCRIPTION - LOCATION
LOCATION: NECK
LOCATION: CHEST
LOCATION: NECK;CHEST
LOCATION: NECK
LOCATION: SHOULDER;RIB CAGE;BACK
LOCATION: CHEST
LOCATION: CHEST;NECK
LOCATION: SHOULDER;RIB CAGE;BACK
LOCATION: SHOULDER;RIB CAGE;BACK

## 2024-06-12 ASSESSMENT — PAIN SCALES - GENERAL
PAINLEVEL_OUTOF10: 8
PAINLEVEL_OUTOF10: 7
PAINLEVEL_OUTOF10: 5
PAINLEVEL_OUTOF10: 10
PAINLEVEL_OUTOF10: 9
PAINLEVEL_OUTOF10: 7
PAINLEVEL_OUTOF10: 0
PAINLEVEL_OUTOF10: 8
PAINLEVEL_OUTOF10: 8

## 2024-06-12 ASSESSMENT — PAIN DESCRIPTION - ORIENTATION
ORIENTATION: MID
ORIENTATION: RIGHT;LEFT;UPPER
ORIENTATION: MID
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
## 2024-06-12 NOTE — PLAN OF CARE
XR cervical flexion/extension reviewed.   Plan  Okay to remove aspen cervical collar  Soft cervical collar for comfort  Follow up in 4-6 weeks
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
## 2024-06-12 NOTE — PROGRESS NOTES
Trauma Tertiary Survey    Admit Date: 6/11/2024  Hospital day 0      Subjective:     Patient seen and examined at the bedside, states that she has some neck pain as well as back pain.  Patient also endorsing some significant chest discomfort.  She also endorses a headache.  Patient maintained an Aspen cervical collar.    Objective:   PHYSICAL EXAM:   Physical Exam  Constitutional:       General: She is not in acute distress.  HENT:      Head: Normocephalic and atraumatic.      Nose: No rhinorrhea.      Comments: Nasal bone tenderness with some swelling of the nasal bridge      Mouth/Throat:      Mouth: Mucous membranes are moist.      Pharynx: Oropharynx is clear.   Eyes:      Extraocular Movements: Extraocular movements intact.      Conjunctiva/sclera: Conjunctivae normal.      Pupils: Pupils are equal, round, and reactive to light.   Neck:      Comments: [place] collar placed   Midline cervical spine tenderness and pain with movement despite negative CT c spine  Cardiovascular:      Rate and Rhythm: Normal rate.      Pulses: Normal pulses.      Comments: Chest tenderness overlying the sternum  Pulmonary:      Effort: Pulmonary effort is normal.   Abdominal:      Palpations: Abdomen is soft.      Tenderness: There is abdominal tenderness. There is no guarding or rebound.      Comments: Tenderness in RUQ   Musculoskeletal:      Cervical back: Tenderness and bony tenderness present. No deformity. Pain with movement present.      Thoracic back: Bony tenderness present.      Lumbar back: No tenderness.      Comments: R shoulder tenderness, ROM normal   R hand tenderness over the thumb  Tenderness overlying left clavicle, upper chest region    Skin:     General: Skin is warm.   Neurological:      Mental Status: She is alert.       Spine:     Spine Tenderness ROM   Cervical several /10 Abnormal, maintain an Aspen cervical collar   Thoracic 1 /10 Normal   Lumbar 0 /10 Normal     Musculoskeletal    Joint Tenderness
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
## 2024-06-13 VITALS
SYSTOLIC BLOOD PRESSURE: 116 MMHG | RESPIRATION RATE: 16 BRPM | HEIGHT: 64 IN | HEART RATE: 80 BPM | WEIGHT: 154.32 LBS | DIASTOLIC BLOOD PRESSURE: 83 MMHG | BODY MASS INDEX: 26.35 KG/M2 | OXYGEN SATURATION: 97 % | TEMPERATURE: 97.9 F

## 2024-06-13 LAB
ANION GAP SERPL CALCULATED.3IONS-SCNC: 10 MMOL/L (ref 9–16)
BASOPHILS # BLD: 0.06 K/UL (ref 0–0.2)
BASOPHILS NFR BLD: 1 % (ref 0–2)
BUN SERPL-MCNC: 15 MG/DL (ref 6–20)
CALCIUM SERPL-MCNC: 8.4 MG/DL (ref 8.6–10.4)
CHLORIDE SERPL-SCNC: 105 MMOL/L (ref 98–107)
CO2 SERPL-SCNC: 23 MMOL/L (ref 20–31)
CREAT SERPL-MCNC: 0.8 MG/DL (ref 0.5–0.9)
EKG ATRIAL RATE: 103 BPM
EKG P AXIS: 34 DEGREES
EKG P-R INTERVAL: 122 MS
EKG Q-T INTERVAL: 338 MS
EKG QRS DURATION: 68 MS
EKG QTC CALCULATION (BAZETT): 442 MS
EKG R AXIS: 25 DEGREES
EKG T AXIS: 29 DEGREES
EKG VENTRICULAR RATE: 103 BPM
EOSINOPHIL # BLD: <0.03 K/UL (ref 0–0.44)
EOSINOPHILS RELATIVE PERCENT: 0 % (ref 1–4)
ERYTHROCYTE [DISTWIDTH] IN BLOOD BY AUTOMATED COUNT: 13.2 % (ref 11.8–14.4)
GFR, ESTIMATED: >90 ML/MIN/1.73M2
GLUCOSE SERPL-MCNC: 114 MG/DL (ref 74–99)
HCT VFR BLD AUTO: 40.5 % (ref 36.3–47.1)
HGB BLD-MCNC: 13.1 G/DL (ref 11.9–15.1)
IMM GRANULOCYTES # BLD AUTO: 0.08 K/UL (ref 0–0.3)
IMM GRANULOCYTES NFR BLD: 1 %
LYMPHOCYTES NFR BLD: 2.16 K/UL (ref 1.1–3.7)
LYMPHOCYTES RELATIVE PERCENT: 21 % (ref 24–43)
MCH RBC QN AUTO: 30.8 PG (ref 25.2–33.5)
MCHC RBC AUTO-ENTMCNC: 32.3 G/DL (ref 28.4–34.8)
MCV RBC AUTO: 95.3 FL (ref 82.6–102.9)
MONOCYTES NFR BLD: 0.72 K/UL (ref 0.1–1.2)
MONOCYTES NFR BLD: 7 % (ref 3–12)
NEUTROPHILS NFR BLD: 70 % (ref 36–65)
NEUTS SEG NFR BLD: 7.15 K/UL (ref 1.5–8.1)
NRBC BLD-RTO: 0 PER 100 WBC
PLATELET # BLD AUTO: 289 K/UL (ref 138–453)
PMV BLD AUTO: 9.2 FL (ref 8.1–13.5)
POTASSIUM SERPL-SCNC: 4.7 MMOL/L (ref 3.7–5.3)
RBC # BLD AUTO: 4.25 M/UL (ref 3.95–5.11)
SODIUM SERPL-SCNC: 138 MMOL/L (ref 136–145)
WBC OTHER # BLD: 10.2 K/UL (ref 3.5–11.3)

## 2024-06-13 PROCEDURE — 6370000000 HC RX 637 (ALT 250 FOR IP): Performed by: SURGERY

## 2024-06-13 PROCEDURE — 6360000002 HC RX W HCPCS: Performed by: SURGERY

## 2024-06-13 PROCEDURE — 80048 BASIC METABOLIC PNL TOTAL CA: CPT

## 2024-06-13 PROCEDURE — 6370000000 HC RX 637 (ALT 250 FOR IP)

## 2024-06-13 PROCEDURE — 6370000000 HC RX 637 (ALT 250 FOR IP): Performed by: STUDENT IN AN ORGANIZED HEALTH CARE EDUCATION/TRAINING PROGRAM

## 2024-06-13 PROCEDURE — 2580000003 HC RX 258

## 2024-06-13 PROCEDURE — 85025 COMPLETE CBC W/AUTO DIFF WBC: CPT

## 2024-06-13 PROCEDURE — 36415 COLL VENOUS BLD VENIPUNCTURE: CPT

## 2024-06-13 RX ORDER — LIDOCAINE 4 G/G
1 PATCH TOPICAL DAILY
Status: DISCONTINUED | OUTPATIENT
Start: 2024-06-13 | End: 2024-06-13 | Stop reason: HOSPADM

## 2024-06-13 RX ORDER — METHOCARBAMOL 750 MG/1
750 TABLET, FILM COATED ORAL EVERY 6 HOURS
Qty: 40 TABLET | Refills: 0 | Status: SHIPPED | OUTPATIENT
Start: 2024-06-13 | End: 2024-06-23

## 2024-06-13 RX ORDER — OXYCODONE HYDROCHLORIDE 5 MG/1
5 TABLET ORAL EVERY 4 HOURS PRN
Qty: 20 TABLET | Refills: 0 | Status: SHIPPED | OUTPATIENT
Start: 2024-06-13 | End: 2024-06-16

## 2024-06-13 RX ORDER — OXYCODONE HYDROCHLORIDE 5 MG/1
5 TABLET ORAL EVERY 4 HOURS PRN
Qty: 18 TABLET | Refills: 0 | Status: SHIPPED | OUTPATIENT
Start: 2024-06-13 | End: 2024-06-13

## 2024-06-13 RX ORDER — ACETAMINOPHEN 500 MG
1000 TABLET ORAL EVERY 8 HOURS SCHEDULED
Status: DISCONTINUED | OUTPATIENT
Start: 2024-06-13 | End: 2024-06-13 | Stop reason: HOSPADM

## 2024-06-13 RX ORDER — LIDOCAINE 4 G/G
1 PATCH TOPICAL DAILY
Qty: 3 PATCH | Refills: 0 | Status: SHIPPED | OUTPATIENT
Start: 2024-06-14

## 2024-06-13 RX ADMIN — ACETAMINOPHEN 1000 MG: 500 TABLET ORAL at 06:29

## 2024-06-13 RX ADMIN — GABAPENTIN 300 MG: 300 CAPSULE ORAL at 08:53

## 2024-06-13 RX ADMIN — KETOROLAC TROMETHAMINE 30 MG: 30 INJECTION, SOLUTION INTRAMUSCULAR; INTRAVENOUS at 00:09

## 2024-06-13 RX ADMIN — ACETAMINOPHEN 1000 MG: 500 TABLET ORAL at 00:00

## 2024-06-13 RX ADMIN — GABAPENTIN 300 MG: 300 CAPSULE ORAL at 02:24

## 2024-06-13 RX ADMIN — METHOCARBAMOL 750 MG: 750 TABLET ORAL at 12:09

## 2024-06-13 RX ADMIN — ACETAMINOPHEN 1000 MG: 500 TABLET ORAL at 13:31

## 2024-06-13 RX ADMIN — SODIUM CHLORIDE, PRESERVATIVE FREE 10 ML: 5 INJECTION INTRAVENOUS at 08:53

## 2024-06-13 RX ADMIN — METHOCARBAMOL 750 MG: 750 TABLET ORAL at 06:30

## 2024-06-13 RX ADMIN — METHOCARBAMOL 750 MG: 750 TABLET ORAL at 02:24

## 2024-06-13 RX ADMIN — POLYETHYLENE GLYCOL 3350 17 G: 17 POWDER, FOR SOLUTION ORAL at 08:53

## 2024-06-13 RX ADMIN — KETOROLAC TROMETHAMINE 30 MG: 30 INJECTION, SOLUTION INTRAMUSCULAR; INTRAVENOUS at 09:27

## 2024-06-13 RX ADMIN — ONDANSETRON 4 MG: 4 TABLET, ORALLY DISINTEGRATING ORAL at 09:27

## 2024-06-13 ASSESSMENT — PAIN DESCRIPTION - LOCATION
LOCATION: STERNUM;SHOULDER
LOCATION: STERNUM
LOCATION: STERNUM
LOCATION: CHEST;STERNUM
LOCATION: CHEST;STERNUM;SHOULDER
LOCATION: STERNUM

## 2024-06-13 ASSESSMENT — PAIN DESCRIPTION - ORIENTATION
ORIENTATION: MID
ORIENTATION: RIGHT;LEFT;MID
ORIENTATION: UPPER;RIGHT;LEFT

## 2024-06-13 ASSESSMENT — PAIN DESCRIPTION - DESCRIPTORS
DESCRIPTORS: DISCOMFORT
DESCRIPTORS: ACHING;DISCOMFORT

## 2024-06-13 ASSESSMENT — PAIN SCALES - GENERAL
PAINLEVEL_OUTOF10: 6
PAINLEVEL_OUTOF10: 7
PAINLEVEL_OUTOF10: 3
PAINLEVEL_OUTOF10: 6
PAINLEVEL_OUTOF10: 7
PAINLEVEL_OUTOF10: 7
PAINLEVEL_OUTOF10: 8
PAINLEVEL_OUTOF10: 7
PAINLEVEL_OUTOF10: 3

## 2024-06-13 ASSESSMENT — PAIN SCALES - WONG BAKER: WONGBAKER_NUMERICALRESPONSE: NO HURT

## 2024-06-13 NOTE — PLAN OF CARE
Problem: Discharge Planning  Goal: Discharge to home or other facility with appropriate resources  6/13/2024 0907 by Keny Griffiths RN  Outcome: Progressing  6/12/2024 2219 by Lashay Burciaga, RN  Outcome: Progressing     Problem: Pain  Goal: Verbalizes/displays adequate comfort level or baseline comfort level  6/13/2024 0907 by Keny Griffiths RN  Outcome: Progressing  6/12/2024 2219 by Lashay Burciaga, RN  Outcome: Progressing

## 2024-06-13 NOTE — DISCHARGE SUMMARY
Weight bearing as tolerated  Wound Care: Daily and as needed.    DISPOSITION: Home    Follow-up:  Ryan Wang MD  [address]  [address]  [place] [id number]  [phone]    Schedule an appointment as soon as possible for a visit in 4 week(s)  Please follow up in 4-6 weeks  Please wear soft neck collar for comfort    Juli Cha, APRN - CNP  [id number]  [facility] [address]  [phone]    Schedule an appointment as soon as possible for a visit  As needed, For wound re-check        SIGNED:  Liza Bautista DO   6/13/2024, 10:50 AM  Time Spent for discharge: 35 minutes

## 2024-06-13 NOTE — PROGRESS NOTES
PROGRESS NOTE    PATIENT NAME: Lorenza Severino  MEDICAL RECORD NO. [id number]  DATE: 6/13/2024    HD: # 1    DIAGNOSIS AND PLAN    Sternal fracture  MMT for pain control   EKG and troponin normal   Ok to ice sternum for improved pain control   Aggressive IS and pulmonary toilet  Lidocaine patch added on    Cervical spine tenderness/ T1 increase signal on MRI, possible fracture  In cervical collar   Neurosurgery consult, ordering flex-ex xrays   If xrays negative, d/c aspen collar and pt can wear soft foam collar for comfort for 2-4 weeks    Dispo: Discharge home today      Chief Complaint: \"Sternal pain\"    SUBJECTIVE    Pt seen and examined at bedside. She reports her pain is mostly in the midline of her chest. No nausea or vomiting. Soft collar in place. Pt reports pain is controlled and she is hoping to return home today.    OBJECTIVE  VITALS:   Vitals:    06/13/24 0730   BP: 124/86   Pulse: 78   Resp: 18   Temp: 98 °F (36.7 °C)   SpO2: 98%     Physical Exam  Constitutional:       Appearance: Normal appearance. She is normal weight.   HENT:      Head: Normocephalic and atraumatic.      Right Ear: External ear normal.      Left Ear: External ear normal.      Nose: Nose normal.      Mouth/Throat:      Mouth: Mucous membranes are dry.      Pharynx: Oropharynx is clear.   Neck:      Comments: Cervical spine tenderness  Soft collar in place  Cardiovascular:      Rate and Rhythm: Normal rate and regular rhythm.      Pulses: Normal pulses.   Pulmonary:      Effort: Pulmonary effort is normal. No respiratory distress.      Breath sounds: No wheezing.   Abdominal:      General: Abdomen is flat. There is no distension.      Palpations: Abdomen is soft.      Tenderness: There is no abdominal tenderness.   Musculoskeletal:         General: No swelling or tenderness. Normal range of motion.   Skin:     General: Skin is warm and dry.      Capillary Refill: Capillary refill takes 2 to 3 seconds.   Neurological:
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
## 2024-06-13 NOTE — PLAN OF CARE
Problem: Discharge Planning  Goal: Discharge to home or other facility with appropriate resources  6/13/2024 1730 by Keny Griffiths RN  Outcome: Adequate for Discharge  6/13/2024 0907 by Keny Griffiths RN  Outcome: Progressing     Problem: Pain  Goal: Verbalizes/displays adequate comfort level or baseline comfort level  6/13/2024 1730 by Keny Griffiths RN  Outcome: Adequate for Discharge  6/13/2024 0907 by Keny Griffiths RN  Outcome: Progressing

## 2024-06-13 NOTE — PLAN OF CARE
Problem: Discharge Planning  Goal: Discharge to home or other facility with appropriate resources  6/12/2024 2219 by Lashay Burciaga, RN  Outcome: Progressing  6/12/2024 1142 by Keny Griffiths RN  Outcome: Progressing     Problem: Pain  Goal: Verbalizes/displays adequate comfort level or baseline comfort level  6/12/2024 2219 by Lashay Burciaga, RN  Outcome: Progressing  6/12/2024 1142 by Keny Griffiths, DEONTE  Outcome: Progressing

## 2024-06-14 LAB
EKG ATRIAL RATE: 82 BPM
EKG P AXIS: 52 DEGREES
EKG P-R INTERVAL: 134 MS
EKG Q-T INTERVAL: 378 MS
EKG QRS DURATION: 82 MS
EKG QTC CALCULATION (BAZETT): 441 MS
EKG R AXIS: 30 DEGREES
EKG T AXIS: 25 DEGREES
EKG VENTRICULAR RATE: 82 BPM

## 2024-06-17 ENCOUNTER — TELEPHONE (OUTPATIENT)
Age: 44
End: 2024-06-17
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
## 2024-07-08 ENCOUNTER — TELEPHONE (OUTPATIENT)
Age: 44
End: 2024-07-08

## 2024-07-08 NOTE — TELEPHONE ENCOUNTER
7/08/20024 - pt appointment scheduled for 7/08/2024. Dr. Wang not available. Left voicemail message for patient to call back and reschedule appointment.

## 2024-07-24 ENCOUNTER — OFFICE VISIT (OUTPATIENT)
Age: 44
End: 2024-07-24
Payer: COMMERCIAL

## 2024-07-24 ENCOUNTER — APPOINTMENT (OUTPATIENT)
Dept: CT IMAGING | Age: 44
End: 2024-07-24
Payer: COMMERCIAL

## 2024-07-24 ENCOUNTER — HOSPITAL ENCOUNTER (EMERGENCY)
Age: 44
Discharge: HOME | End: 2024-07-24
Attending: EMERGENCY MEDICINE
Payer: COMMERCIAL

## 2024-07-24 VITALS
TEMPERATURE: 97.8 F | RESPIRATION RATE: 18 BRPM | HEART RATE: 90 BPM | SYSTOLIC BLOOD PRESSURE: 113 MMHG | OXYGEN SATURATION: 99 % | DIASTOLIC BLOOD PRESSURE: 86 MMHG

## 2024-07-24 VITALS
BODY MASS INDEX: 27.66 KG/M2 | SYSTOLIC BLOOD PRESSURE: 114 MMHG | HEART RATE: 64 BPM | DIASTOLIC BLOOD PRESSURE: 82 MMHG | WEIGHT: 162 LBS | HEIGHT: 64 IN | RESPIRATION RATE: 14 BRPM

## 2024-07-24 DIAGNOSIS — R51.9 ACUTE NONINTRACTABLE HEADACHE, UNSPECIFIED HEADACHE TYPE: Primary | ICD-10-CM

## 2024-07-24 DIAGNOSIS — S22.000A COMPRESSION FRACTURE OF BODY OF THORACIC VERTEBRA (HCC): Primary | ICD-10-CM

## 2024-07-24 LAB
ANION GAP SERPL CALCULATED.3IONS-SCNC: 10 MMOL/L (ref 9–16)
BASOPHILS # BLD: 0.05 K/UL (ref 0–0.2)
BASOPHILS NFR BLD: 1 % (ref 0–2)
BUN SERPL-MCNC: 10 MG/DL (ref 6–20)
CALCIUM SERPL-MCNC: 8.9 MG/DL (ref 8.6–10.4)
CHLORIDE SERPL-SCNC: 103 MMOL/L (ref 98–107)
CO2 SERPL-SCNC: 25 MMOL/L (ref 20–31)
CREAT SERPL-MCNC: 0.8 MG/DL (ref 0.5–0.9)
EOSINOPHIL # BLD: 0.07 K/UL (ref 0–0.44)
EOSINOPHILS RELATIVE PERCENT: 1 % (ref 1–4)
ERYTHROCYTE [DISTWIDTH] IN BLOOD BY AUTOMATED COUNT: 13.2 % (ref 11.8–14.4)
GFR, ESTIMATED: >90 ML/MIN/1.73M2
GLUCOSE SERPL-MCNC: 107 MG/DL (ref 74–99)
HCG SERPL QL: NEGATIVE
HCT VFR BLD AUTO: 43.7 % (ref 36.3–47.1)
HGB BLD-MCNC: 14.3 G/DL (ref 11.9–15.1)
IMM GRANULOCYTES # BLD AUTO: <0.03 K/UL (ref 0–0.3)
IMM GRANULOCYTES NFR BLD: 0 %
LYMPHOCYTES NFR BLD: 2 K/UL (ref 1.1–3.7)
LYMPHOCYTES RELATIVE PERCENT: 28 % (ref 24–43)
MCH RBC QN AUTO: 31.3 PG (ref 25.2–33.5)
MCHC RBC AUTO-ENTMCNC: 32.7 G/DL (ref 28.4–34.8)
MCV RBC AUTO: 95.6 FL (ref 82.6–102.9)
MONOCYTES NFR BLD: 0.51 K/UL (ref 0.1–1.2)
MONOCYTES NFR BLD: 7 % (ref 3–12)
NEUTROPHILS NFR BLD: 63 % (ref 36–65)
NEUTS SEG NFR BLD: 4.6 K/UL (ref 1.5–8.1)
NRBC BLD-RTO: 0 PER 100 WBC
PLATELET # BLD AUTO: 302 K/UL (ref 138–453)
PMV BLD AUTO: 10 FL (ref 8.1–13.5)
POTASSIUM SERPL-SCNC: 3.7 MMOL/L (ref 3.7–5.3)
RBC # BLD AUTO: 4.57 M/UL (ref 3.95–5.11)
SODIUM SERPL-SCNC: 138 MMOL/L (ref 136–145)
WBC OTHER # BLD: 7.2 K/UL (ref 3.5–11.3)

## 2024-07-24 PROCEDURE — 84703 CHORIONIC GONADOTROPIN ASSAY: CPT

## 2024-07-24 PROCEDURE — G8419 CALC BMI OUT NRM PARAM NOF/U: HCPCS | Performed by: NEUROLOGICAL SURGERY

## 2024-07-24 PROCEDURE — 99285 EMERGENCY DEPT VISIT HI MDM: CPT

## 2024-07-24 PROCEDURE — 85025 COMPLETE CBC W/AUTO DIFF WBC: CPT

## 2024-07-24 PROCEDURE — 2580000003 HC RX 258: Performed by: EMERGENCY MEDICINE

## 2024-07-24 PROCEDURE — 96374 THER/PROPH/DIAG INJ IV PUSH: CPT

## 2024-07-24 PROCEDURE — 80048 BASIC METABOLIC PNL TOTAL CA: CPT

## 2024-07-24 PROCEDURE — G8427 DOCREV CUR MEDS BY ELIG CLIN: HCPCS | Performed by: NEUROLOGICAL SURGERY

## 2024-07-24 PROCEDURE — 99213 OFFICE O/P EST LOW 20 MIN: CPT | Performed by: NEUROLOGICAL SURGERY

## 2024-07-24 PROCEDURE — 6360000002 HC RX W HCPCS: Performed by: EMERGENCY MEDICINE

## 2024-07-24 PROCEDURE — 70450 CT HEAD/BRAIN W/O DYE: CPT

## 2024-07-24 PROCEDURE — 96375 TX/PRO/DX INJ NEW DRUG ADDON: CPT

## 2024-07-24 PROCEDURE — 6360000004 HC RX CONTRAST MEDICATION: Performed by: EMERGENCY MEDICINE

## 2024-07-24 PROCEDURE — 1036F TOBACCO NON-USER: CPT | Performed by: NEUROLOGICAL SURGERY

## 2024-07-24 PROCEDURE — 70498 CT ANGIOGRAPHY NECK: CPT

## 2024-07-24 RX ORDER — CEPHALEXIN 500 MG/1
CAPSULE ORAL
COMMUNITY

## 2024-07-24 RX ORDER — NAPROXEN 500 MG/1
500 TABLET ORAL 2 TIMES DAILY PRN
COMMUNITY
Start: 2024-06-24

## 2024-07-24 RX ORDER — ERENUMAB-AOOE 70 MG/ML
INJECTION SUBCUTANEOUS
COMMUNITY

## 2024-07-24 RX ORDER — NADOLOL 20 MG/1
TABLET ORAL
COMMUNITY

## 2024-07-24 RX ORDER — FLUCONAZOLE 150 MG/1
TABLET ORAL
COMMUNITY

## 2024-07-24 RX ORDER — CETIRIZINE HYDROCHLORIDE 5 MG/1
5 TABLET ORAL
COMMUNITY
Start: 2022-09-12

## 2024-07-24 RX ORDER — DIPHENOXYLATE HYDROCHLORIDE AND ATROPINE SULFATE 2.5; .025 MG/1; MG/1
1 TABLET ORAL 4 TIMES DAILY PRN
COMMUNITY
Start: 2023-10-18

## 2024-07-24 RX ORDER — MEDROXYPROGESTERONE ACETATE 5 MG/1
TABLET ORAL
COMMUNITY

## 2024-07-24 RX ORDER — OMEPRAZOLE 40 MG/1
40 CAPSULE, DELAYED RELEASE ORAL
COMMUNITY
Start: 2023-05-03

## 2024-07-24 RX ORDER — PROCHLORPERAZINE EDISYLATE 5 MG/ML
10 INJECTION INTRAMUSCULAR; INTRAVENOUS ONCE
Status: COMPLETED | OUTPATIENT
Start: 2024-07-24 | End: 2024-07-24

## 2024-07-24 RX ORDER — BENZONATATE 200 MG/1
CAPSULE ORAL
COMMUNITY

## 2024-07-24 RX ORDER — IBUPROFEN 800 MG/1
800 TABLET ORAL EVERY 8 HOURS PRN
COMMUNITY
Start: 2024-02-28

## 2024-07-24 RX ORDER — ATOMOXETINE 40 MG/1
CAPSULE ORAL
COMMUNITY
Start: 2024-07-06

## 2024-07-24 RX ORDER — RIZATRIPTAN BENZOATE 10 MG/1
TABLET, ORALLY DISINTEGRATING ORAL
COMMUNITY

## 2024-07-24 RX ORDER — DIPHENHYDRAMINE HYDROCHLORIDE 50 MG/ML
25 INJECTION INTRAMUSCULAR; INTRAVENOUS ONCE
Status: COMPLETED | OUTPATIENT
Start: 2024-07-24 | End: 2024-07-24

## 2024-07-24 RX ORDER — METHOCARBAMOL 750 MG/1
750 TABLET, FILM COATED ORAL 3 TIMES DAILY PRN
COMMUNITY
Start: 2024-06-13

## 2024-07-24 RX ORDER — LAMOTRIGINE 25 MG/1
25 TABLET ORAL EVERY MORNING
COMMUNITY

## 2024-07-24 RX ORDER — 0.9 % SODIUM CHLORIDE 0.9 %
1000 INTRAVENOUS SOLUTION INTRAVENOUS ONCE
Status: COMPLETED | OUTPATIENT
Start: 2024-07-24 | End: 2024-07-24

## 2024-07-24 RX ORDER — CLONAZEPAM 0.5 MG/1
TABLET ORAL
COMMUNITY

## 2024-07-24 RX ORDER — HYDROCODONE BITARTRATE AND ACETAMINOPHEN 5; 325 MG/1; MG/1
TABLET ORAL
COMMUNITY
Start: 2024-06-24

## 2024-07-24 RX ADMIN — IOPAMIDOL 90 ML: 755 INJECTION, SOLUTION INTRAVENOUS at 17:38

## 2024-07-24 RX ADMIN — PROCHLORPERAZINE EDISYLATE 10 MG: 5 INJECTION INTRAMUSCULAR; INTRAVENOUS at 16:39

## 2024-07-24 RX ADMIN — SODIUM CHLORIDE 1000 ML: 9 INJECTION, SOLUTION INTRAVENOUS at 16:39

## 2024-07-24 RX ADMIN — DIPHENHYDRAMINE HYDROCHLORIDE 25 MG: 50 INJECTION INTRAMUSCULAR; INTRAVENOUS at 16:39

## 2024-07-24 ASSESSMENT — PAIN SCALES - GENERAL: PAINLEVEL_OUTOF10: 8

## 2024-07-24 ASSESSMENT — PAIN - FUNCTIONAL ASSESSMENT: PAIN_FUNCTIONAL_ASSESSMENT: 0-10
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
## 2024-07-24 NOTE — ED NOTES
The following labs were labeled with appropriate pt sticker and tubed to lab:     [x] Blue     [x] Lavender   [] on ice  [x] Green/yellow  [] Green/black [] on ice  [] Moran  [] on ice  [x] Yellow  [] Red  [] Pink  [] Type/ Screen  [] ABG  [] VBG    [] COVID-19 swab    [] Rapid  [] PCR  [] Flu swab  [] Peds Viral Panel     [] Urine Sample  [] Fecal Sample  [] Pelvic Cultures  [] Blood Cultures  [] X 2  [] STREP Cultures  [] Wound Cultures

## 2024-07-24 NOTE — ED PROVIDER NOTES
Handoff taken on the following patient from prior Attending Physician:    Pt Name: Lorenza Smartguin    PCP:  Juli Cha APRN - JORDAN         Attestation    I was available and discussed any additional care issues that arose and coordinated the management plans with the resident(s) caring for the patient during my duty period. Any areas of disagreement with resident’s documentation of care or procedures are noted on the chart. I was personally present for the key portions of any/all procedures during my duty period. I have documented in the chart those procedures where I was not present during the key portions.    Patient is a 43-year-old with a headache patient has a history of aneurysm CT CTAs pending given the prior history medications have been given     Iron Weber DO  07/24/24 2216
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
## 2024-07-31 ENCOUNTER — OFFICE VISIT (OUTPATIENT)
Age: 44
End: 2024-07-31
Payer: COMMERCIAL

## 2024-07-31 VITALS
BODY MASS INDEX: 27.66 KG/M2 | RESPIRATION RATE: 16 BRPM | SYSTOLIC BLOOD PRESSURE: 123 MMHG | WEIGHT: 162 LBS | HEIGHT: 64 IN | HEART RATE: 79 BPM | DIASTOLIC BLOOD PRESSURE: 80 MMHG

## 2024-07-31 DIAGNOSIS — S22.000A COMPRESSION FRACTURE OF BODY OF THORACIC VERTEBRA (HCC): Primary | ICD-10-CM

## 2024-07-31 PROCEDURE — 1036F TOBACCO NON-USER: CPT | Performed by: NEUROLOGICAL SURGERY

## 2024-07-31 PROCEDURE — 99213 OFFICE O/P EST LOW 20 MIN: CPT | Performed by: NEUROLOGICAL SURGERY

## 2024-07-31 PROCEDURE — G8419 CALC BMI OUT NRM PARAM NOF/U: HCPCS | Performed by: NEUROLOGICAL SURGERY

## 2024-07-31 PROCEDURE — G8427 DOCREV CUR MEDS BY ELIG CLIN: HCPCS | Performed by: NEUROLOGICAL SURGERY
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
## 2024-09-25 ENCOUNTER — OFFICE VISIT (OUTPATIENT)
Age: 44
End: 2024-09-25
Payer: COMMERCIAL

## 2024-09-25 VITALS
RESPIRATION RATE: 14 BRPM | HEART RATE: 84 BPM | WEIGHT: 162 LBS | HEIGHT: 64 IN | DIASTOLIC BLOOD PRESSURE: 70 MMHG | BODY MASS INDEX: 27.66 KG/M2 | SYSTOLIC BLOOD PRESSURE: 104 MMHG

## 2024-09-25 DIAGNOSIS — S22.000A COMPRESSION FRACTURE OF BODY OF THORACIC VERTEBRA (HCC): Primary | ICD-10-CM

## 2024-09-25 PROCEDURE — 99213 OFFICE O/P EST LOW 20 MIN: CPT | Performed by: NEUROLOGICAL SURGERY

## 2024-09-25 RX ORDER — PREGABALIN 25 MG/1
CAPSULE ORAL
COMMUNITY